# Patient Record
Sex: MALE | Race: WHITE | NOT HISPANIC OR LATINO | URBAN - METROPOLITAN AREA
[De-identification: names, ages, dates, MRNs, and addresses within clinical notes are randomized per-mention and may not be internally consistent; named-entity substitution may affect disease eponyms.]

---

## 2018-09-11 ENCOUNTER — OFFICE VISIT (OUTPATIENT)
Dept: FAMILY MEDICINE | Facility: CLINIC | Age: 43
End: 2018-09-11
Payer: COMMERCIAL

## 2018-09-11 VITALS
HEART RATE: 72 BPM | DIASTOLIC BLOOD PRESSURE: 84 MMHG | BODY MASS INDEX: 27.22 KG/M2 | WEIGHT: 201 LBS | SYSTOLIC BLOOD PRESSURE: 134 MMHG | HEIGHT: 72 IN | TEMPERATURE: 97.8 F

## 2018-09-11 DIAGNOSIS — Z12.5 SCREENING FOR PROSTATE CANCER: ICD-10-CM

## 2018-09-11 DIAGNOSIS — Z00.00 ROUTINE GENERAL MEDICAL EXAMINATION AT A HEALTH CARE FACILITY: ICD-10-CM

## 2018-09-11 DIAGNOSIS — R53.83 FATIGUE, UNSPECIFIED TYPE: ICD-10-CM

## 2018-09-11 DIAGNOSIS — Z13.6 CARDIOVASCULAR SCREENING; LDL GOAL LESS THAN 100: ICD-10-CM

## 2018-09-11 DIAGNOSIS — Z83.719 FAMILY HISTORY OF POLYPS IN THE COLON: Primary | ICD-10-CM

## 2018-09-11 LAB
ALBUMIN SERPL-MCNC: 3.6 G/DL (ref 3.4–5)
ALP SERPL-CCNC: 99 U/L (ref 40–150)
ALT SERPL W P-5'-P-CCNC: 38 U/L (ref 0–70)
ANION GAP SERPL CALCULATED.3IONS-SCNC: 6 MMOL/L (ref 3–14)
AST SERPL W P-5'-P-CCNC: 22 U/L (ref 0–45)
BASOPHILS # BLD AUTO: 0.1 10E9/L (ref 0–0.2)
BASOPHILS NFR BLD AUTO: 1.2 %
BILIRUB SERPL-MCNC: 0.5 MG/DL (ref 0.2–1.3)
BUN SERPL-MCNC: 22 MG/DL (ref 7–30)
CALCIUM SERPL-MCNC: 8.8 MG/DL (ref 8.5–10.1)
CHLORIDE SERPL-SCNC: 107 MMOL/L (ref 94–109)
CHOLEST SERPL-MCNC: 160 MG/DL
CO2 SERPL-SCNC: 27 MMOL/L (ref 20–32)
CREAT SERPL-MCNC: 0.97 MG/DL (ref 0.66–1.25)
DEPRECATED CALCIDIOL+CALCIFEROL SERPL-MC: 37 UG/L (ref 20–75)
DIFFERENTIAL METHOD BLD: NORMAL
EOSINOPHIL # BLD AUTO: 0.2 10E9/L (ref 0–0.7)
EOSINOPHIL NFR BLD AUTO: 4.7 %
ERYTHROCYTE [DISTWIDTH] IN BLOOD BY AUTOMATED COUNT: 13.2 % (ref 10–15)
GFR SERPL CREATININE-BSD FRML MDRD: 85 ML/MIN/1.7M2
GLUCOSE SERPL-MCNC: 89 MG/DL (ref 70–99)
HCT VFR BLD AUTO: 46.9 % (ref 40–53)
HDLC SERPL-MCNC: 34 MG/DL
HGB BLD-MCNC: 16 G/DL (ref 13.3–17.7)
LDLC SERPL CALC-MCNC: 81 MG/DL
LYMPHOCYTES # BLD AUTO: 1.9 10E9/L (ref 0.8–5.3)
LYMPHOCYTES NFR BLD AUTO: 38.2 %
MCH RBC QN AUTO: 30.1 PG (ref 26.5–33)
MCHC RBC AUTO-ENTMCNC: 34.1 G/DL (ref 31.5–36.5)
MCV RBC AUTO: 88 FL (ref 78–100)
MONOCYTES # BLD AUTO: 0.5 10E9/L (ref 0–1.3)
MONOCYTES NFR BLD AUTO: 9.2 %
NEUTROPHILS # BLD AUTO: 2.3 10E9/L (ref 1.6–8.3)
NEUTROPHILS NFR BLD AUTO: 46.7 %
NONHDLC SERPL-MCNC: 126 MG/DL
PLATELET # BLD AUTO: 227 10E9/L (ref 150–450)
POTASSIUM SERPL-SCNC: 4.1 MMOL/L (ref 3.4–5.3)
PROT SERPL-MCNC: 7.3 G/DL (ref 6.8–8.8)
PSA SERPL-ACNC: 2.35 UG/L (ref 0–4)
RBC # BLD AUTO: 5.31 10E12/L (ref 4.4–5.9)
SODIUM SERPL-SCNC: 140 MMOL/L (ref 133–144)
TRIGL SERPL-MCNC: 226 MG/DL
TSH SERPL DL<=0.005 MIU/L-ACNC: 2.42 MU/L (ref 0.4–4)
WBC # BLD AUTO: 4.9 10E9/L (ref 4–11)

## 2018-09-11 PROCEDURE — G0103 PSA SCREENING: HCPCS | Performed by: FAMILY MEDICINE

## 2018-09-11 PROCEDURE — 80061 LIPID PANEL: CPT | Performed by: FAMILY MEDICINE

## 2018-09-11 PROCEDURE — 36415 COLL VENOUS BLD VENIPUNCTURE: CPT | Performed by: FAMILY MEDICINE

## 2018-09-11 PROCEDURE — 80053 COMPREHEN METABOLIC PANEL: CPT | Performed by: FAMILY MEDICINE

## 2018-09-11 PROCEDURE — 84443 ASSAY THYROID STIM HORMONE: CPT | Performed by: FAMILY MEDICINE

## 2018-09-11 PROCEDURE — 99396 PREV VISIT EST AGE 40-64: CPT | Performed by: FAMILY MEDICINE

## 2018-09-11 PROCEDURE — 85025 COMPLETE CBC W/AUTO DIFF WBC: CPT | Performed by: FAMILY MEDICINE

## 2018-09-11 PROCEDURE — 82306 VITAMIN D 25 HYDROXY: CPT | Performed by: FAMILY MEDICINE

## 2018-09-11 ASSESSMENT — ENCOUNTER SYMPTOMS
ARTHRALGIAS: 0
JOINT SWELLING: 0
PARESTHESIAS: 0
HEADACHES: 0
NERVOUS/ANXIOUS: 0
HEMATURIA: 0
FEVER: 0
SHORTNESS OF BREATH: 0
EYE PAIN: 0
PALPITATIONS: 0
DIARRHEA: 0
SORE THROAT: 0
HEARTBURN: 0
WEAKNESS: 0
COUGH: 0
DIZZINESS: 0
DYSURIA: 0
FREQUENCY: 0
MYALGIAS: 0
NAUSEA: 0
ABDOMINAL PAIN: 0
HEMATOCHEZIA: 0
CONSTIPATION: 0
CHILLS: 0

## 2018-09-11 ASSESSMENT — PAIN SCALES - GENERAL: PAINLEVEL: NO PAIN (0)

## 2018-09-11 NOTE — LETTER
[facility]   [address] [place]  [address]  [phone]                                   September 13, 2018    Dannie Hart  [address] [place] [facility] [address]        Dear Dannie,    The triglycerides are elevated.  No medication needed for this.      Advise low sugar diet.  Of note, some of the triglycerides elevation may be genetic.    Other labs are fine.    Results for orders placed or performed in visit on 09/11/18   Lipid panel reflex to direct LDL Fasting   Result Value Ref Range    Cholesterol 160 <200 mg/dL    Triglycerides 226 (H) <150 mg/dL    HDL Cholesterol 34 (L) >39 mg/dL    LDL Cholesterol Calculated 81 <100 mg/dL    Non HDL Cholesterol 126 <130 mg/dL   TSH with free T4 reflex   Result Value Ref Range    TSH 2.42 0.40 - 4.00 mU/L   CBC with platelets differential   Result Value Ref Range    WBC 4.9 4.0 - 11.0 10e9/L    RBC Count 5.31 4.4 - 5.9 10e12/L    Hemoglobin 16.0 13.3 - 17.7 g/dL    Hematocrit 46.9 40.0 - 53.0 %    MCV 88 78 - 100 fl    MCH 30.1 26.5 - 33.0 pg    MCHC 34.1 31.5 - 36.5 g/dL    RDW 13.2 10.0 - 15.0 %    Platelet Count 227 150 - 450 10e9/L    Diff Method Automated Method     % Neutrophils 46.7 %    % Lymphocytes 38.2 %    % Monocytes 9.2 %    % Eosinophils 4.7 %    % Basophils 1.2 %    Absolute Neutrophil 2.3 1.6 - 8.3 10e9/L    Absolute Lymphocytes 1.9 0.8 - 5.3 10e9/L    Absolute Monocytes 0.5 0.0 - 1.3 10e9/L    Absolute Eosinophils 0.2 0.0 - 0.7 10e9/L    Absolute Basophils 0.1 0.0 - 0.2 10e9/L   Comprehensive metabolic panel   Result Value Ref Range    Sodium 140 133 - 144 mmol/L    Potassium 4.1 3.4 - 5.3 mmol/L    Chloride 107 94 - 109 mmol/L    Carbon Dioxide 27 20 - 32 mmol/L    Anion Gap 6 3 - 14 mmol/L    Glucose 89 70 - 99 mg/dL    Urea Nitrogen 22 7 - 30 mg/dL    Creatinine 0.97 0.66 - 1.25 mg/dL    GFR Estimate 85 >60 mL/min/1.7m2    GFR Estimate If Black >90 >60 mL/min/1.7m2    Calcium 8.8 8.5 - 10.1 mg/dL     Bilirubin Total 0.5 0.2 - 1.3 mg/dL    Albumin 3.6 3.4 - 5.0 g/dL    Protein Total 7.3 6.8 - 8.8 g/dL    Alkaline Phosphatase 99 40 - 150 U/L    ALT 38 0 - 70 U/L    AST 22 0 - 45 U/L   Vitamin D Deficiency   Result Value Ref Range    Vitamin D Deficiency screening 37 20 - 75 ug/L   Prostate spec antigen screen   Result Value Ref Range    PSA 2.35 0 - 4 ug/L       If you have any questions please call the clinic at [phone]    Sincerely,    Adeel Ramirez MD  [facility]

## 2018-09-11 NOTE — PATIENT INSTRUCTIONS
Call to schedule colonoscopy but also check with insurance to make sure it is covered    We will send you lab results    Keep working on healthy diet/exercise     Occasional melatonin as needed at night

## 2018-09-11 NOTE — PROGRESS NOTES
SUBJECTIVE:   CC: Dannie Hart is an 42 year old male who presents for preventative health visit.     Physical   Annual:     Getting at least 3 servings of Calcium per day:  Yes    Bi-annual eye exam:  Yes    Dental care twice a year:  Yes    Sleep apnea or symptoms of sleep apnea:  Daytime drowsiness    Diet:  Regular (no restrictions)    Frequency of exercise:  2-3 days/week    Duration of exercise:  45-60 minutes    Taking medications regularly:  Yes    Medication side effects:  Not applicable    Additional concerns today:  YES        Trouble sleeping; wakes up 4-6 times per night, hard to get back to sleep  Tired    5-6 hours of sleep total  Needs 8     In the last few months    Patient not sure what is going on    Always light sleeper    Room/ bed conducive to sleep  [place]    Some increased stress with son going back to school        Saw orthopedics for shoulder, elbow, carpal tunnel  All better   Had carpal tunnel repair        Today's PHQ-2 Score:   PHQ-2 ( 1999 [employer]) 9/11/2018   Q1: Little interest or pleasure in doing things 0   Q2: Feeling down, depressed or hopeless 0   PHQ-2 Score 0   Q1: Little interest or pleasure in doing things Not at all   Q2: Feeling down, depressed or hopeless Not at all   PHQ-2 Score 0       Abuse: Current or Past(Physical, Sexual or Emotional)- No  Do you feel safe in your environment - Yes    Social History   Substance Use Topics     Smoking status: Never Smoker     Smokeless tobacco: Never Used     Alcohol use Yes      Comment: socially     Alcohol Use 9/11/2018   If you drink alcohol do you typically have greater than 3 drinks per day OR greater than 7 drinks per week? No   No flowsheet data found.    Last PSA: No results found for: PSA    Reviewed orders with patient. Reviewed health maintenance and updated orders accordingly - Yes       Reviewed and updated as needed this visit by clinical staff  Tobacco  Allergies  Problems  Med Hx  Surg Hx  Fam  "Hx  Soc Hx        Reviewed and updated as needed this visit by Provider            Review of Systems   Constitutional: Negative for chills and fever.   HENT: Negative for congestion, ear pain, hearing loss and sore throat.    Eyes: Negative for pain and visual disturbance.   Respiratory: Negative for cough and shortness of breath.    Cardiovascular: Negative for chest pain, palpitations and peripheral edema.   Gastrointestinal: Negative for abdominal pain, constipation, diarrhea, heartburn, hematochezia and nausea.   Genitourinary: Negative for discharge, dysuria, frequency, genital sores, hematuria, impotence and urgency.   Musculoskeletal: Negative for arthralgias, joint swelling and myalgias.   Skin: Negative for rash.   Neurological: Negative for dizziness, weakness, headaches and paresthesias.   Psychiatric/Behavioral: Negative for mood changes. The patient is not nervous/anxious.         [email or web address], works for about 4 hours but then same problem arises    No breathing problems through nose etc    No snoring     No stopping breathing at night    Wt up a little    No napping during the day    Vision fine    OBJECTIVE:   /84 (BP Location: Right arm, Patient Position: Chair, Cuff Size: Adult Regular)  Pulse 72  Temp 97.8  F (36.6  C) (Oral)  Ht 5' 11.56\" (1.817 m)  Wt 201 lb (91.2 kg)  BMI 27.6 kg/m2    Physical Exam  GENERAL: healthy, alert and no distress  HENT: ear canals and TM's normal, nose and mouth without ulcers or lesions  NECK: no adenopathy, no asymmetry, masses, or scars and thyroid normal to palpation  RESP: lungs clear to auscultation - no rales, rhonchi or wheezes  CV: regular rate and rhythm, normal S1 S2, no S3 or S4, no murmur, click or rub, no peripheral edema and peripheral pulses strong  ABDOMEN: soft, nontender, no hepatosplenomegaly, no masses and bowel sounds normal  MS: no gross musculoskeletal defects noted, no edema  SKIN: no suspicious lesions or rashes  NEURO: Normal " "strength and tone, mentation intact and speech normal  PSYCH: mentation appears normal, affect normal/bright    Diagnostic Test Results:  none     ASSESSMENT/PLAN:   Dannie was seen today for physical and health maintenance.    Diagnoses and all orders for this visit:    Family history of polyps in the colon  -     GASTROENTEROLOGY ADULT REF PROCEDURE ONLY Other; [facility] [phone]    Routine general medical examination at a health care facility    Fatigue, unspecified type  -     TSH with free T4 reflex  -     CBC with platelets differential  -     Comprehensive metabolic panel  -     Vitamin D Deficiency    CARDIOVASCULAR SCREENING; LDL GOAL LESS THAN 100  -     Lipid panel reflex to direct LDL Fasting    Screening for prostate cancer  -     Prostate spec antigen screen    Other orders  -     Cancel: HIV Antigen Antibody Combo  -     Cancel: TDAP, IM (10 - 64 YRS) - Adacel  -     Cancel: VACCINE ADMINISTRATION, INITIAL    Discussed multiple issues with patient  No std concern  Check labs  Can use occasional melatonin but advised against prescription sleep meds  With family history, ordered colonoscopy but patient will make sure this is covered by insurance      COUNSELING:   Reviewed preventive health counseling, as reflected in patient instructions       Regular exercise       Healthy diet/nutrition       Vision screening       Family planning       Safe sex practices/STD prevention       Prostate cancer screening    BP Readings from Last 1 Encounters:   09/11/18 134/84     Estimated body mass index is 27.6 kg/(m^2) as calculated from the following:    Height as of this encounter: 5' 11.56\" (1.817 m).    Weight as of this encounter: 201 lb (91.2 kg).    BP Screening:   Last 3 BP Readings:    BP Readings from Last 3 Encounters:   09/11/18 134/84   11/18/16 114/77   02/28/14 113/64       The following was recommended to the patient:  Re-screen BP within a year and recommended lifestyle modifications  Weight " management plan: Discussed healthy diet and exercise guidelines and patient will follow up in 12 months in clinic to re-evaluate.     reports that he has never smoked. He has never used smokeless tobacco.      Counseling Resources:  ATP IV Guidelines  Pooled Cohorts Equation Calculator  FRAX Risk Assessment  ICSI Preventive Guidelines  Dietary Guidelines for Americans, 2010  USDA's MyPlate  ASA Prophylaxis  Lung CA Screening    Adeel Ramirez MD  [facility]  Answers for HPI/ROS submitted by the patient on 9/11/2018   PHQ-2 Score: 0

## 2018-09-11 NOTE — MR AVS SNAPSHOT
After Visit Summary   9/11/2018    Dannie Hart    MRN: [id number]           Patient Information     Date Of Birth          1975        Visit Information        Provider Department      9/11/2018 7:40 AM Adeel Ramirez MD [facility]        Today's Diagnoses     Family history of polyps in the colon    -  1    Fatigue, unspecified type        CARDIOVASCULAR SCREENING; LDL GOAL LESS THAN 100        Screening for prostate cancer          Care Instructions    Call to schedule colonoscopy but also check with insurance to make sure it is covered    We will send you lab results    Keep working on healthy diet/exercise     Occasional melatonin as needed at night           Follow-ups after your visit        Additional Services     GASTROENTEROLOGY ADULT REF PROCEDURE ONLY Other; [facility] [phone]       Last Lab Result: Creatinine (mg/dL)       Date                     Value                 11/18/2016               0.99             ----------  Body mass index is 27.6 kg/(m^2).     Needed:  No  Language:  English    Patient will be contacted to schedule procedure.     Please be aware that coverage of these services is subject to the terms and limitations of your health insurance plan.  Call member services at your health plan with any benefit or coverage questions.  Any procedures must be performed at a [facility] facility OR coordinated by your clinic's referral office.    Please bring the following with you to your appointment:    (1) Any X-Rays, CTs or MRIs which have been performed.  Contact the facility where they were done to arrange for  prior to your scheduled appointment.    (2) List of current medications   (3) This referral request   (4) Any documents/labs given to you for this referral                  Who to contact     If you have questions or need follow up information about today's clinic visit or your schedule please contact [facility]  "[facility] directly at [phone].  Normal or non-critical lab and imaging results will be communicated to you by MyChart, letter or phone within 4 business days after the clinic has received the results. If you do not hear from us within 7 days, please contact the clinic through [employer] or phone. If you have a critical or abnormal lab result, we will notify you by phone as soon as possible.  Submit refill requests through [employer] or call your pharmacy and they will forward the refill request to us. Please allow 3 business days for your refill to be completed.          Additional Information About Your Visit        MoodMe Information     MoodMe lets you send messages to your doctor, view your test results, renew your prescriptions, schedule appointments and more. To sign up, go to [email or web address] . Click on \"Log in\" on the left side of the screen, which will take you to the Welcome page. Then click on \"Sign up Now\" on the right side of the page.     You will be asked to enter the access code listed below, as well as some personal information. Please follow the directions to create your username and password.     Your access code is: [email or web address]  Expires: 12/10/2018  8:37 AM     Your access code will  in 90 days. If you need help or a new code, please call your [facility] clinic or [phone].        Care EveryWhere ID     This is your Care EveryWhere ID. This could be used by other organizations to access your [facility] medical records  [id number]        Your Vitals Were     Pulse Temperature Height BMI (Body Mass Index)          72 97.8  F (36.6  C) (Oral) 5' 11.56\" (1.817 m) 27.6 kg/m2         Blood Pressure from Last 3 Encounters:   18 134/84   16 114/77   14 113/64    Weight from Last 3 Encounters:   18 201 lb (91.2 kg)   16 195 lb (88.5 kg)   14 205 lb (93 kg)              We Performed the Following     CBC with platelets differential     " Comprehensive metabolic panel     GASTROENTEROLOGY ADULT REF PROCEDURE ONLY Other; [facility] [phone]     Lipid panel reflex to direct LDL Fasting     Prostate spec antigen screen     TSH with free T4 reflex     Vitamin D Deficiency        Primary Care Provider Office Phone # Fax #    Adeel Ramirez [phone]       [address] [facility] [address]        Equal Access to Services     [facility] : Hadii aad ku hadasho Soomaali, waaxda luqadaha, qaybta kaalmada adeegyada, waxay idiin hayaan adeeg kharash la'aan . So [facility] [phone].    ATENCIÓN: Si habla español, tiene a lindsey disposición servicios gratuitos de asistencia lingüística. Llame al [phone].    We comply with applicable federal civil rights laws and [place] laws. We do not discriminate on the basis of race, color, national origin, age, disability, sex, sexual orientation, or gender identity.            Thank you!     Thank you for choosing [facility]  for your care. Our goal is always to provide you with excellent care. Hearing back from our patients is one way we can continue to improve our services. Please take a few minutes to complete the written survey that you may receive in the mail after your visit with us. Thank you!             Your Updated Medication List - Protect others around you: Learn how to safely use, store and throw away your medicines at www.disposemymeds.org.          This list is accurate as of 9/11/18  8:37 AM.  Always use your most recent med list.                   Brand Name Dispense Instructions for use Diagnosis    ibuprofen 800 MG tablet    ADVIL/MOTRIN     Take 800 mg by mouth every 8 hours as needed. As needed

## 2018-09-13 NOTE — PROGRESS NOTES
The triglycerides are elevated.  No medication needed for this.      Advise low sugar diet.  Of note, some of the triglycerides elevation may be genetic.    Other labs are fine.    Adeel Ramirez MD

## 2018-09-24 ENCOUNTER — TELEPHONE (OUTPATIENT)
Dept: FAMILY MEDICINE | Facility: CLINIC | Age: 43
End: 2018-09-24

## 2018-10-02 ENCOUNTER — TRANSFERRED RECORDS (OUTPATIENT)
Dept: HEALTH INFORMATION MANAGEMENT | Facility: CLINIC | Age: 43
End: 2018-10-02

## 2018-10-15 ENCOUNTER — OFFICE VISIT (OUTPATIENT)
Dept: FAMILY MEDICINE | Facility: CLINIC | Age: 43
End: 2018-10-15
Payer: COMMERCIAL

## 2018-10-15 VITALS
BODY MASS INDEX: 27.46 KG/M2 | OXYGEN SATURATION: 97 % | HEART RATE: 72 BPM | WEIGHT: 200 LBS | TEMPERATURE: 97.2 F | SYSTOLIC BLOOD PRESSURE: 115 MMHG | DIASTOLIC BLOOD PRESSURE: 70 MMHG

## 2018-10-15 DIAGNOSIS — F41.9 ANXIETY: ICD-10-CM

## 2018-10-15 DIAGNOSIS — F32.1 MODERATE MAJOR DEPRESSION (H): ICD-10-CM

## 2018-10-15 PROCEDURE — 99213 OFFICE O/P EST LOW 20 MIN: CPT | Performed by: FAMILY MEDICINE

## 2018-10-15 ASSESSMENT — PATIENT HEALTH QUESTIONNAIRE - PHQ9: 5. POOR APPETITE OR OVEREATING: NEARLY EVERY DAY

## 2018-10-15 ASSESSMENT — ANXIETY QUESTIONNAIRES
3. WORRYING TOO MUCH ABOUT DIFFERENT THINGS: NEARLY EVERY DAY
IF YOU CHECKED OFF ANY PROBLEMS ON THIS QUESTIONNAIRE, HOW DIFFICULT HAVE THESE PROBLEMS MADE IT FOR YOU TO DO YOUR WORK, TAKE CARE OF THINGS AT HOME, OR GET ALONG WITH OTHER PEOPLE: EXTREMELY DIFFICULT
1. FEELING NERVOUS, ANXIOUS, OR ON EDGE: NEARLY EVERY DAY
5. BEING SO RESTLESS THAT IT IS HARD TO SIT STILL: NEARLY EVERY DAY
GAD7 TOTAL SCORE: 21
7. FEELING AFRAID AS IF SOMETHING AWFUL MIGHT HAPPEN: NEARLY EVERY DAY
6. BECOMING EASILY ANNOYED OR IRRITABLE: NEARLY EVERY DAY
2. NOT BEING ABLE TO STOP OR CONTROL WORRYING: NEARLY EVERY DAY

## 2018-10-15 NOTE — PATIENT INSTRUCTIONS
Start 1/2 pill daily sertaline for 1-2 weeks, then can increase to one pill daily    See us in 1-2 months, sooner if needed     Could also call to schedule counseling/ therapy if desired    Call with problems/  Questions    Exercise, etc

## 2018-10-15 NOTE — PROGRESS NOTES
SUBJECTIVE:   Dannie Hart is a 42 year old male who presents to clinic today for the following health issues:        Abnormal Mood Symptoms  Onset: 4-5 weeks     Description:   Depression: YES  Anxiety: YES    Accompanying Signs & Symptoms:  Still participating in activities that you used to enjoy: no  Fatigue: YES  Irritability: YES  Difficulty concentrating: YES  Changes in appetite: YES- a little bit   Problems with sleep: YES  Heart racing/beating fast : no   Thoughts of hurting yourself or others: none    History:   Recent stress: YES  Prior depression hospitalization: None  Family history of depression: no   Family history of anxiety: no     Precipitating factors:   Alcohol/drug use: no     Alleviating factors:  None     Therapies Tried and outcome: None         Problem list and histories reviewed & adjusted, as indicated.  Additional history: as documented         Reviewed and updated as needed this visit by clinical staff  Tobacco  Allergies  Meds  Med Hx  Surg Hx  Fam Hx  Soc Hx      Reviewed and updated as needed this visit by Provider          was a little stressed when seen for physical    But last few weeks real bad    Some days particularly bad    10 year ago divorce, was on meds for 3 months then but was fine afterward    Hard to focus on grad classes    Sleeping not good     Was having some sleep issues before but worse now    Sometimes sleep well but sometimes not    Short temper     Irritable    Almost a tingling / uncomfortable feeling, mainly in hands    Phy     Taking some grad classes for education    Hungry recently    Up about 5 lb    No particular event set this off        Physical Exam   Constitutional: He is oriented to person, place, and time and well-developed, well-nourished, and in no distress. No distress.   HENT:   Head: Normocephalic and atraumatic.   Eyes: Conjunctivae are normal.   Neck: Carotid bruit is not present.   Cardiovascular: Normal rate, regular  rhythm, normal heart sounds and intact distal pulses.  Exam reveals no gallop and no friction rub.    No murmur heard.  Pulmonary/Chest: Effort normal and breath sounds normal. No respiratory distress. He has no wheezes. He has no rales.   Musculoskeletal: He exhibits no edema.   Neurological: He is alert and oriented to person, place, and time.   Skin: He is not diaphoretic.   Psychiatric: Mood and affect normal.   no tremor of hands        See phq9 and gad7    ASSESSMENT / PLAN:  (F41.9) Anxiety  (primary encounter diagnosis)  Comment: significant scores on both gad7 and phq9.  Discussed in detail.  Prudent to start med.  If patient also wants to do counseling, call for appointment.  I did referral   Plan: MENTAL HEALTH REFERRAL  - Adult; Outpatient         Treatment; Individual/Couples/Family/Group         Therapy/Health Psychology; [facility]: [facility] [phone]; We will         contact you to schedule the appointment or         please call with any questions, sertraline         (ZOLOFT) 50 MG tablet        See us in 1-2 months, sooner if needed     (F32.1) Moderate major depression (H)  Comment: as above  Plan: sertraline (ZOLOFT) 50 MG tablet               I reviewed the patient's medications, allergies, medical history, family history, and social history.    Adeel Ramirez MD

## 2018-10-15 NOTE — MR AVS SNAPSHOT
After Visit Summary   10/15/2018    Dannie Hart    MRN: [id number]           Patient Information     Date Of Birth          1975        Visit Information        Provider Department      10/15/2018 8:20 AM Adeel Ramirez MD [facility]        Today's Diagnoses     Anxiety    -  1    Moderate major depression (H)          Care Instructions    Start 1/2 pill daily sertaline for 1-2 weeks, then can increase to one pill daily    See us in 1-2 months, sooner if needed     Could also call to schedule counseling/ therapy if desired    Call with problems/  Questions    Exercise, etc           Follow-ups after your visit        Additional Services     MENTAL HEALTH REFERRAL  - Adult; Outpatient Treatment; Individual/Couples/Family/Group Therapy/Health Psychology; [facility]: [facility] [phone]; We will contact you to schedule the appointment or please call with any questions       All scheduling is subject to the client's specific insurance plan & benefits, provider/location availability, and provider clinical specialities.  Please arrive 15 minutes early for your first appointment and bring your completed paperwork.    Please be aware that coverage of these services is subject to the terms and limitations of your health insurance plan.  Call member services at your health plan with any benefit or coverage questions.                            Who to contact     If you have questions or need follow up information about today's clinic visit or your schedule please contact [facility] directly at [phone].  Normal or non-critical lab and imaging results will be communicated to you by MyChart, letter or phone within 4 business days after the clinic has received the results. If you do not hear from us within 7 days, please contact the clinic through MyChart or phone. If you have a critical or abnormal lab result, we will notify you by phone  "as soon as possible.  Submit refill requests through [employer] or call your pharmacy and they will forward the refill request to us. Please allow 3 business days for your refill to be completed.          Additional Information About Your Visit        [employer] Information     [employer] lets you send messages to your doctor, view your test results, renew your prescriptions, schedule appointments and more. To sign up, go to [email or web address] . Click on \"Log in\" on the left side of the screen, which will take you to the Welcome page. Then click on \"Sign up Now\" on the right side of the page.     You will be asked to enter the access code listed below, as well as some personal information. Please follow the directions to create your username and password.     Your access code is: [email or web address]  Expires: 12/10/2018  8:37 AM     Your access code will  in 90 days. If you need help or a new code, please call your [place] clinic or [phone].        Care EveryWhere ID     This is your Care EveryWhere ID. This could be used by other organizations to access your [facility] medical records  [id number]        Your Vitals Were     Pulse Temperature Pulse Oximetry BMI (Body Mass Index)          72 97.2  F (36.2  C) (Oral) 97% 27.46 kg/m2         Blood Pressure from Last 3 Encounters:   10/15/18 115/70   18 134/84   16 114/77    Weight from Last 3 Encounters:   10/15/18 200 lb (90.7 kg)   18 201 lb (91.2 kg)   16 195 lb (88.5 kg)              We Performed the Following     MENTAL HEALTH REFERRAL  - Adult; Outpatient Treatment; Individual/Couples/Family/Group Therapy/Health Psychology; G: [facility] [phone]; We will contact you to schedule the appointment or please call with any questions          Today's Medication Changes          These changes are accurate as of 10/15/18  8:53 AM.  If you have any questions, ask your nurse or doctor.               Start taking these " medicines.        Dose/Directions    sertraline 50 MG tablet   Commonly known as:  ZOLOFT   Used for:  Anxiety, Moderate major depression (H)   Started by:  Adeel Ramirez MD        Dose:  50 mg   Take 1 tablet (50 mg) by mouth daily   Quantity:  30 tablet   Refills:  1            Where to get your medicines      These medications were sent to [id number] - [place], [place] - [address] AT [facility] [address]  [address], [place] [address]     Phone:  [phone]     sertraline 50 MG tablet                Primary Care Provider Office Phone # Fax #    Adeel Ramirez [phone]       [address] [facility] [address]        Equal Access to Services     [facility] : Hadii dg obando hadasho Soomaali, waaxda luqadaha, qaybta kaalmada adeegyada, cathi pinedain dora kim . So [facility] [phone].    ATENCIÓN: Si habla español, tiene a lindsey disposición servicios gratuitos de asistencia lingüística. Llame al [phone].    We comply with applicable federal civil rights laws and [place] laws. We do not discriminate on the basis of race, color, national origin, age, disability, sex, sexual orientation, or gender identity.            Thank you!     Thank you for choosing [facility]  for your care. Our goal is always to provide you with excellent care. Hearing back from our patients is one way we can continue to improve our services. Please take a few minutes to complete the written survey that you may receive in the mail after your visit with us. Thank you!             Your Updated Medication List - Protect others around you: Learn how to safely use, store and throw away your medicines at www.disposemymeds.org.          This list is accurate as of 10/15/18  8:53 AM.  Always use your most recent med list.                   Brand Name Dispense Instructions for use Diagnosis    ibuprofen 800 MG tablet    ADVIL/MOTRIN      Take 800 mg by mouth every 8 hours as needed. As needed        sertraline 50 MG tablet    ZOLOFT    30 tablet    Take 1 tablet (50 mg) by mouth daily    Anxiety, Moderate major depression (H)

## 2018-10-16 ASSESSMENT — ANXIETY QUESTIONNAIRES: GAD7 TOTAL SCORE: 21

## 2018-10-16 ASSESSMENT — PATIENT HEALTH QUESTIONNAIRE - PHQ9: SUM OF ALL RESPONSES TO PHQ QUESTIONS 1-9: 20

## 2018-10-23 PROBLEM — F41.9 ANXIETY: Status: ACTIVE | Noted: 2018-10-23

## 2018-10-23 PROBLEM — F32.1 MODERATE MAJOR DEPRESSION (H): Status: ACTIVE | Noted: 2018-10-23

## 2018-12-02 ENCOUNTER — TELEPHONE (OUTPATIENT)
Dept: FAMILY MEDICINE | Facility: CLINIC | Age: 43
End: 2018-12-02

## 2018-12-02 DIAGNOSIS — F41.9 ANXIETY: ICD-10-CM

## 2018-12-02 DIAGNOSIS — F32.1 MODERATE MAJOR DEPRESSION (H): ICD-10-CM

## 2018-12-03 NOTE — TELEPHONE ENCOUNTER
"Requested Prescriptions   Pending Prescriptions Disp Refills     sertraline (ZOLOFT) 50 MG tablet [Pharmacy Med Name: SERTRALINE 50MG TABLETS] 30 tablet 0    Last Written Prescription Date:  10/15/18  Last Fill Quantity: 30,  # refills: 1   Last office visit: 10/15/2018 with prescribing provider:     Future Office Visit:     Sig: TAKE 1 TABLET(50 MG) BY MOUTH DAILY    SSRIs Protocol Failed    12/2/2018  7:12 PM       Failed - PHQ-9 score less than 5 in past 6 months    Please review last PHQ-9 score.          Passed - Patient is age 18 or older       Passed - Recent (6 mo) or future (30 days) visit within the authorizing provider's specialty    Patient had office visit in the last 6 months or has a visit in the next 30 days with authorizing provider or within the authorizing provider's specialty.  See \"Patient Info\" tab in inbasket, or \"Choose Columns\" in Meds & Orders section of the refill encounter.              "

## 2018-12-04 NOTE — TELEPHONE ENCOUNTER
See plan from last visit 10/15/18:    Patient Instructions    Start 1/2 pill daily sertaline for 1-2 weeks, then can increase to one pill daily     See us in 1-2 months, sooner if needed        Should not be totally out of med yet.    Attempted to call patient at home/mobile number, left message on voicemail; patient was instructed to return call to [facility] RN directly on the RN call back line at [phone], needs to schedule follow up, will he need short refill before seen?    Nemo Zarco, RN  [facility]

## 2018-12-04 NOTE — TELEPHONE ENCOUNTER
"Patient called back which I picked up, he has 2 weeks of zoloft left, says it is helping a lot.    Scheduled office visit this Friday, no refill needed before then.    \"Refusal\" routed back to pharmacy with note.    Nemo Zarco RN  [facility]      "

## 2018-12-04 NOTE — TELEPHONE ENCOUNTER
Patient called triage line back.  Left a message that he will be available after 2:00.  Belem Santos RN [facility] Triage.

## 2018-12-07 ENCOUNTER — OFFICE VISIT (OUTPATIENT)
Dept: FAMILY MEDICINE | Facility: CLINIC | Age: 43
End: 2018-12-07
Payer: COMMERCIAL

## 2018-12-07 VITALS
DIASTOLIC BLOOD PRESSURE: 72 MMHG | WEIGHT: 199 LBS | BODY MASS INDEX: 27.33 KG/M2 | OXYGEN SATURATION: 97 % | TEMPERATURE: 97.6 F | SYSTOLIC BLOOD PRESSURE: 110 MMHG | HEART RATE: 63 BPM

## 2018-12-07 DIAGNOSIS — F32.1 MODERATE MAJOR DEPRESSION (H): ICD-10-CM

## 2018-12-07 DIAGNOSIS — F41.9 ANXIETY: ICD-10-CM

## 2018-12-07 PROCEDURE — 99213 OFFICE O/P EST LOW 20 MIN: CPT | Performed by: FAMILY MEDICINE

## 2018-12-07 ASSESSMENT — PATIENT HEALTH QUESTIONNAIRE - PHQ9
SUM OF ALL RESPONSES TO PHQ QUESTIONS 1-9: 6
5. POOR APPETITE OR OVEREATING: SEVERAL DAYS

## 2018-12-07 ASSESSMENT — ANXIETY QUESTIONNAIRES
2. NOT BEING ABLE TO STOP OR CONTROL WORRYING: NOT AT ALL
6. BECOMING EASILY ANNOYED OR IRRITABLE: SEVERAL DAYS
IF YOU CHECKED OFF ANY PROBLEMS ON THIS QUESTIONNAIRE, HOW DIFFICULT HAVE THESE PROBLEMS MADE IT FOR YOU TO DO YOUR WORK, TAKE CARE OF THINGS AT HOME, OR GET ALONG WITH OTHER PEOPLE: SOMEWHAT DIFFICULT
3. WORRYING TOO MUCH ABOUT DIFFERENT THINGS: SEVERAL DAYS
GAD7 TOTAL SCORE: 5
5. BEING SO RESTLESS THAT IT IS HARD TO SIT STILL: SEVERAL DAYS
1. FEELING NERVOUS, ANXIOUS, OR ON EDGE: SEVERAL DAYS
7. FEELING AFRAID AS IF SOMETHING AWFUL MIGHT HAPPEN: NOT AT ALL

## 2018-12-07 NOTE — PATIENT INSTRUCTIONS
Stay on same medication    If still on this in 5-6 months, return to clinic     If you want to try to taper off before then, go 1/2 pill daily for a week, then 1/2 pill every other day for a week, then off    Call with problems/ questions

## 2018-12-07 NOTE — MR AVS SNAPSHOT
After Visit Summary   12/7/2018    Dannie Hart    MRN: [id number]           Patient Information     Date Of Birth          1975        Visit Information        Provider Department      12/7/2018 3:40 PM Adeel Ramirez MD [facility]        Today's Diagnoses     Anxiety        Moderate major depression (H)          Care Instructions    Stay on same medication    If still on this in 5-6 months, return to clinic     If you want to try to taper off before then, go 1/2 pill daily for a week, then 1/2 pill every other day for a week, then off    Call with problems/ questions            Follow-ups after your visit        Who to contact     If you have questions or need follow up information about today's clinic visit or your schedule please contact [facility] directly at [phone].  Normal or non-critical lab and imaging results will be communicated to you by MyChart, letter or phone within 4 business days after the clinic has received the results. If you do not hear from us within 7 days, please contact the clinic through MyChart or phone. If you have a critical or abnormal lab result, we will notify you by phone as soon as possible.  Submit refill requests through [employer] or call your pharmacy and they will forward the refill request to us. Please allow 3 business days for your refill to be completed.          Additional Information About Your Visit        Care EveryWhere ID     This is your Care EveryWhere ID. This could be used by other organizations to access your [place] [facility] medical records  [id number]        Your Vitals Were     Pulse Temperature Pulse Oximetry BMI (Body Mass Index)          63 97.6  F (36.4  C) (Oral) 97% 27.33 kg/m2         Blood Pressure from Last 3 Encounters:   12/07/18 110/72   10/15/18 115/70   09/11/18 134/84    Weight from Last 3 Encounters:   12/07/18 199 lb (90.3 kg)   10/15/18 200 lb (90.7 kg)   09/11/18 201 lb  (91.2 kg)              We Performed the Following     DEPRESSION ACTION PLAN (DAP)          Where to get your medicines      These medications were sent to [facility] - [place], [place] - [address] AT [facility] [address]  [address], [place] [address]     Phone:  [phone]     sertraline 50 MG tablet          Primary Care Provider Office Phone # Fax #    Adeel Ramirez [phone]       [address] [facility] [address]        Equal Access to Services     [facility]: Hadii aad ku hadasho Soomaali, waaxda luqadaha, qaybta kaalmada adeegyada, cathi kim . So [facility] [phone].    ATENCIÓN: Si habla español, tiene a lindsey disposición servicios gratuitos de asistencia lingüística. [facility] [phone].    We comply with applicable federal civil rights laws and [place] laws. We do not discriminate on the basis of race, color, national origin, age, disability, sex, sexual orientation, or gender identity.            Thank you!     Thank you for choosing [facility]  for your care. Our goal is always to provide you with excellent care. Hearing back from our patients is one way we can continue to improve our services. Please take a few minutes to complete the written survey that you may receive in the mail after your visit with us. Thank you!             Your Updated Medication List - Protect others around you: Learn how to safely use, store and throw away your medicines at www.disposemymeds.org.          This list is accurate as of 12/7/18  4:03 PM.  Always use your most recent med list.                   Brand Name Dispense Instructions for use Diagnosis    ibuprofen 800 MG tablet    ADVIL/MOTRIN     Take 800 mg by mouth every 8 hours as needed. As needed        sertraline 50 MG tablet    ZOLOFT    90 tablet    Take 1 tablet (50 mg) by mouth daily    Anxiety, Moderate major depression (H)

## 2018-12-07 NOTE — PROGRESS NOTES
SUBJECTIVE:   Dannie Hart is a 43 year old male who presents to clinic today for the following health issues:      Depression and Anxiety Follow-Up    Status since last visit: Improved     Other associated symptoms:None    Complicating factors:     Significant life event: No     Current substance abuse: None    PHQ 10/15/2018 12/7/2018   PHQ-9 Total Score 20 6   Q9: Suicide Ideation Not at all Not at all     EVELYN-7 SCORE 10/15/2018   Total Score 21        PHQ-9  English  PHQ-9   Any Language  EVELYN-7  Suicide Assessment Five-step Evaluation and Treatment (SAFE-T)    Amount of exercise or physical activity: daily     Problems taking medications regularly: No    Medication side effects: none    Diet: regular (no restrictions)             Problem list and histories reviewed & adjusted, as indicated.  Additional history: as documented         Reviewed and updated as needed this visit by clinical staff  Tobacco  Allergies  Meds  Med Hx  Surg Hx  Fam Hx  Soc Hx      Reviewed and updated as needed this visit by Provider          sleeping better    Still waking up but not as much    Doing well on med     No side effects    Patient has not done the counselor/ therapist thing yet    Physical Exam   Constitutional: He is oriented to person, place, and time and well-developed, well-nourished, and in no distress. No distress.   HENT:   Head: Normocephalic and atraumatic.   Eyes: Conjunctivae are normal.   Neck: Carotid bruit is not present.   Cardiovascular: Normal rate, regular rhythm, normal heart sounds and intact distal pulses.  Exam reveals no gallop and no friction rub.    No murmur heard.  Pulmonary/Chest: Effort normal and breath sounds normal. No respiratory distress. He has no wheezes. He has no rales.   Neurological: He is alert and oriented to person, place, and time.   Skin: He is not diaphoretic.   Psychiatric: Mood and affect normal.   no tremor of extremity    ASSESSMENT / PLAN:  (F41.9) Anxiety  Comment:  patient stable on med.  Refills given.  See AVS  Plan: sertraline (ZOLOFT) 50 MG tablet             (F32.1) Moderate major depression (H)  Comment: as above  Plan: sertraline (ZOLOFT) 50 MG tablet        Patient still has the referral for counselor/therapist if needed       I reviewed the patient's medications, allergies, medical history, family history, and social history.    Adeel Ramirez MD

## 2018-12-08 ASSESSMENT — ANXIETY QUESTIONNAIRES: GAD7 TOTAL SCORE: 5

## 2019-05-22 ENCOUNTER — TELEPHONE (OUTPATIENT)
Dept: FAMILY MEDICINE | Facility: CLINIC | Age: 44
End: 2019-05-22

## 2019-05-22 NOTE — TELEPHONE ENCOUNTER
Panel Management Review      Patient has the following on his problem list:     Depression / Dysthymia review    Measure:  Needs PHQ-9 score of 4 or less during index window.  Administer PHQ-9 and if score is 5 or more, send encounter to provider for next steps.    5 - 7 month window range: 02/14/2019-06/14/2019    PHQ-9 SCORE 10/15/2018 12/7/2018   PHQ-9 Total Score 20 6       If PHQ-9 recheck is 5 or more, route to provider for next steps.    Patient is due for:  PHQ9      Composite cancer screening  Chart review shows that this patient is due/due soon for the following None  Summary:    Patient is due/failing the following:   PHQ9    Action needed:   Patient needs to do PHQ9.    Type of outreach:    Phone, left message for patient to call back.  05/22/2019    Questions for provider review:    None                                                                                                                                    Cynthia López [facility]       Chart routed to Care Team .

## 2019-05-30 ENCOUNTER — TELEPHONE (OUTPATIENT)
Dept: FAMILY MEDICINE | Facility: CLINIC | Age: 44
End: 2019-05-30

## 2019-05-30 DIAGNOSIS — Z80.9 FAMILY HISTORY OF CANCER: Primary | ICD-10-CM

## 2019-05-30 ASSESSMENT — PATIENT HEALTH QUESTIONNAIRE - PHQ9: SUM OF ALL RESPONSES TO PHQ QUESTIONS 1-9: 8

## 2019-05-30 NOTE — TELEPHONE ENCOUNTER
Reason for Call: Request for an order or referral:    Order or referral being requested: Patient is requesting that orders be placed for genetic testing. They would like patient to be tested for pancreatic, colon and prostrate.  His mother has PABLO   gene mutation. The name of his mom's mutation is  [id number] He states he needs the order faxed to Krystyna at [phone].     Date needed: as soon as possible    Has the patient been seen by the PCP for this problem? Not Applicable    Additional comments:     Phone number Patient can be reached at:  Home number on file [phone] (home)    Best Time:  any    Can we leave a detailed message on this number?  YES    Call taken on 5/30/2019 at 10:57 AM by Radha Alvarez

## 2019-05-30 NOTE — TELEPHONE ENCOUNTER
Routed to Dr. Ramirez to address request for genetic testing.    Of note, it appears patient is due to be seen in June.  Perhaps can discuss this in clinic unless provider aware of this issue.    Nemo Zarco RN  [facility]

## 2019-05-30 NOTE — TELEPHONE ENCOUNTER
Patient called back I did the phq-9 questions with him. He will make his follow up in June.   Cynthia López CMA

## 2019-05-31 ENCOUNTER — TELEPHONE (OUTPATIENT)
Dept: ONCOLOGY | Facility: CLINIC | Age: 44
End: 2019-05-31

## 2019-05-31 NOTE — TELEPHONE ENCOUNTER
Attempt # 1  Called patient at home [phone]  Was call answered?  No answer, left message to call nurse line at [phone]    Referral information:  We have a sent a notice to a staff member of the Cancer Risk Management Program to give you a call to assist with scheduling your appointment.  You may also call  [phone] ([phone]) to initiate scheduling.      Zoë Sampson RN  [facility]

## 2019-05-31 NOTE — TELEPHONE ENCOUNTER
Genetic cancer testing is quite complicated    Prudent to have patient see one of the genetic counselor for consult    I did referral    Please give patient the number to call and schedule appointment    Adeel Ramirez MD

## 2019-05-31 NOTE — TELEPHONE ENCOUNTER
Patient returned call - nurse picked up - relayed message from PCP and referral phone number, Patient verbalized understanding and repeated phone number correctly. Patient states the genetic counselor needs the referral faxed to:      Fax # [phone]  Attn: Krystyna.    Krystyna's Phone [phone] if questions.      Zoë Sampson RN  [facility]

## 2019-05-31 NOTE — TELEPHONE ENCOUNTER
ONCOLOGY INTAKE: Records Information      APPT INFORMATION:  Referring provider:  Dr. Adeel Ramirez  Referring provider s clinic:  [facility]  Reason for visit/diagnosis:  Family history of cancer [Z80.9]  Has patient been notified of appointment date and time?: NA    RECORDS INFORMATION:  Were the records received with the referral (via Rightfax)? No    ADDITIONAL INFORMATION:  Left  for Pt with hours and number. Will call Pt on [id number] if he has not been scheduled. Referral in [facility]

## 2019-06-14 ENCOUNTER — TRANSFERRED RECORDS (OUTPATIENT)
Dept: HEALTH INFORMATION MANAGEMENT | Facility: CLINIC | Age: 44
End: 2019-06-14

## 2019-06-19 ENCOUNTER — OFFICE VISIT (OUTPATIENT)
Dept: FAMILY MEDICINE | Facility: CLINIC | Age: 44
End: 2019-06-19
Payer: COMMERCIAL

## 2019-06-19 VITALS
OXYGEN SATURATION: 97 % | TEMPERATURE: 98 F | DIASTOLIC BLOOD PRESSURE: 83 MMHG | WEIGHT: 196.5 LBS | BODY MASS INDEX: 27.51 KG/M2 | HEART RATE: 58 BPM | SYSTOLIC BLOOD PRESSURE: 127 MMHG | HEIGHT: 71 IN

## 2019-06-19 DIAGNOSIS — E78.1 HIGH TRIGLYCERIDES: ICD-10-CM

## 2019-06-19 DIAGNOSIS — F41.9 ANXIETY: Primary | ICD-10-CM

## 2019-06-19 DIAGNOSIS — F32.1 MODERATE MAJOR DEPRESSION (H): ICD-10-CM

## 2019-06-19 PROCEDURE — 99213 OFFICE O/P EST LOW 20 MIN: CPT | Performed by: FAMILY MEDICINE

## 2019-06-19 ASSESSMENT — ANXIETY QUESTIONNAIRES
GAD7 TOTAL SCORE: 6
3. WORRYING TOO MUCH ABOUT DIFFERENT THINGS: NOT AT ALL
1. FEELING NERVOUS, ANXIOUS, OR ON EDGE: SEVERAL DAYS
IF YOU CHECKED OFF ANY PROBLEMS ON THIS QUESTIONNAIRE, HOW DIFFICULT HAVE THESE PROBLEMS MADE IT FOR YOU TO DO YOUR WORK, TAKE CARE OF THINGS AT HOME, OR GET ALONG WITH OTHER PEOPLE: SOMEWHAT DIFFICULT
6. BECOMING EASILY ANNOYED OR IRRITABLE: MORE THAN HALF THE DAYS
2. NOT BEING ABLE TO STOP OR CONTROL WORRYING: SEVERAL DAYS
5. BEING SO RESTLESS THAT IT IS HARD TO SIT STILL: SEVERAL DAYS
7. FEELING AFRAID AS IF SOMETHING AWFUL MIGHT HAPPEN: SEVERAL DAYS

## 2019-06-19 ASSESSMENT — PAIN SCALES - GENERAL: PAINLEVEL: NO PAIN (0)

## 2019-06-19 ASSESSMENT — PATIENT HEALTH QUESTIONNAIRE - PHQ9
5. POOR APPETITE OR OVEREATING: NOT AT ALL
SUM OF ALL RESPONSES TO PHQ QUESTIONS 1-9: 8

## 2019-06-19 ASSESSMENT — MIFFLIN-ST. JEOR: SCORE: 1822.57

## 2019-06-19 NOTE — PROGRESS NOTES
Subjective     Dannie Hart is a 43 year old male who presents to clinic today for the following health issues:    HPI   Depression and Anxiety Follow-Up    How are you doing with your depression since your last visit? Improved     How are you doing with your anxiety since your last visit?  Improved     Are you having other symptoms that might be associated with depression or anxiety? No    Have you had a significant life event? No     Do you have any concerns with your use of alcohol or other drugs? No    Social History     Tobacco Use     Smoking status: Never Smoker     Smokeless tobacco: Never Used   Substance Use Topics     Alcohol use: Yes     Comment: socially     Drug use: No     PHQ 10/15/2018 12/7/2018 5/30/2019   PHQ-9 Total Score 20 6 8   Q9: Thoughts of better off dead/self-harm past 2 weeks Not at all Not at all Not at all     EVELYN-7 SCORE 10/15/2018 12/7/2018   Total Score 21 5     No flowsheet data found.  No flowsheet data found.         Suicide Assessment Five-step Evaluation and Treatment (SAFE-T)    Amount of exercise or physical activity: 2-3 days/week for an average of 45-60 minutes    Problems taking medications regularly: No    Medication side effects: none    Diet: regular (no restrictions)      None      Was weaning down but then external event comes up    Mom diagnosed with breast cancer    3 days ago mother in law passed away unexpectedly    Mom has a gene mutation    Patient had testing done, waiting to hear back on that    Tingly fingers and lightheaded symptoms when med wears off    Patient was down to 1/2 pill every other day         They will have to travel to [place] etc     Exercise regularly            Reviewed and updated as needed this visit by Provider         Review of Systems   See above   Overall feeling well physically       Objective    There were no vitals taken for this visit.  There is no height or weight on file to calculate BMI.  Physical Exam   Full physical not  done     Mentation and affect are fine    No tremor of speech or extremity    Radial pulses symmetric     Diagnostic Test Results:  Labs reviewed in Epic             ASSESSMENT / PLAN:  (F41.9) Anxiety  (primary encounter diagnosis)  Comment: overall patient has been doing okay except for these stressful events recently   Plan: sertraline (ZOLOFT) 50 MG tablet        When things calm down patient wants to try to taper down and possibly off medication     (F32.1) Moderate major depression (H)  Comment: as above   Plan: sertraline (ZOLOFT) 50 MG tablet             (E78.1) High triglycerides  Comment: only mildly high   Plan: keep working on healthy diet/exercise     Patient to mail us the gene testing results       I reviewed the patient's medications, allergies, medical history, family history, and social history.    Adeel Ramirez MD

## 2019-06-19 NOTE — PATIENT INSTRUCTIONS
Okay to stay on sertraline for now    Taper down and off when / if able     Keep working on healthy diet/exercise     Mail us a copy of the gene results

## 2019-06-20 ENCOUNTER — TRANSFERRED RECORDS (OUTPATIENT)
Dept: HEALTH INFORMATION MANAGEMENT | Facility: CLINIC | Age: 44
End: 2019-06-20

## 2019-06-20 ASSESSMENT — ANXIETY QUESTIONNAIRES: GAD7 TOTAL SCORE: 6

## 2019-06-25 PROBLEM — E78.1 HIGH TRIGLYCERIDES: Status: ACTIVE | Noted: 2019-06-25

## 2019-10-10 DIAGNOSIS — F32.1 MODERATE MAJOR DEPRESSION (H): ICD-10-CM

## 2019-10-10 DIAGNOSIS — F41.9 ANXIETY: ICD-10-CM

## 2019-10-10 NOTE — TELEPHONE ENCOUNTER
"Requested Prescriptions   Pending Prescriptions Disp Refills     sertraline (ZOLOFT) 50 MG tablet [Pharmacy Med Name: SERTRALINE 50MG TABLETS] 30 tablet 0     Sig: TAKE 1 TABLET(50 MG) BY MOUTH DAILY   Last Written Prescription Date:  6-19-19  Last Fill Quantity: 30,  # refills: 2   Last office visit: 6/19/2019 with prescribing provider:  6-19-19   Future Office Visit:        SSRIs Protocol Failed - 10/10/2019 12:32 PM        Failed - PHQ-9 score less than 5 in past 6 months     Please review last PHQ-9 score.           Passed - Medication is active on med list        Passed - Patient is age 18 or older        Passed - Recent (6 mo) or future (30 days) visit within the authorizing provider's specialty     Patient had office visit in the last 6 months or has a visit in the next 30 days with authorizing provider or within the authorizing provider's specialty.  See \"Patient Info\" tab in inbasket, or \"Choose Columns\" in Meds & Orders section of the refill encounter.              "

## 2019-10-11 NOTE — TELEPHONE ENCOUNTER
PHQ-9 score:    PHQ-9 SCORE 6/19/2019   PHQ-9 Total Score 8     See patient instructions at last visit 6/19/19, patient was having acute issues causing anxiety/depression:    Instructions     Okay to stay on sertraline for now     Taper down and off when / if able         I called patient, he answered, states he can talk if it is quick (so phone PHQ9 out of the question).  He states he did taper down but then ended up needing to go back on 1 tab daily.   States he is doing well at that dose.   He is planning to follow up in December.    Prescription approved per [facility] Refill Protocol.    Nemo Zarco RN  [facility]

## 2019-11-12 ENCOUNTER — OFFICE VISIT (OUTPATIENT)
Dept: FAMILY MEDICINE | Facility: CLINIC | Age: 44
End: 2019-11-12
Payer: COMMERCIAL

## 2019-11-12 VITALS
BODY MASS INDEX: 27.85 KG/M2 | TEMPERATURE: 97.8 F | WEIGHT: 201.13 LBS | DIASTOLIC BLOOD PRESSURE: 75 MMHG | HEART RATE: 73 BPM | SYSTOLIC BLOOD PRESSURE: 129 MMHG

## 2019-11-12 DIAGNOSIS — R53.83 FATIGUE, UNSPECIFIED TYPE: ICD-10-CM

## 2019-11-12 DIAGNOSIS — F32.1 MODERATE MAJOR DEPRESSION (H): ICD-10-CM

## 2019-11-12 DIAGNOSIS — E78.1 HIGH TRIGLYCERIDES: ICD-10-CM

## 2019-11-12 DIAGNOSIS — F41.9 ANXIETY: ICD-10-CM

## 2019-11-12 DIAGNOSIS — Z00.00 ENCOUNTER FOR PREVENTATIVE ADULT HEALTH CARE EXAMINATION: Primary | ICD-10-CM

## 2019-11-12 DIAGNOSIS — Z12.5 SCREENING FOR PROSTATE CANCER: ICD-10-CM

## 2019-11-12 LAB
ALBUMIN SERPL-MCNC: 4 G/DL (ref 3.4–5)
ALP SERPL-CCNC: 85 U/L (ref 40–150)
ALT SERPL W P-5'-P-CCNC: 34 U/L (ref 0–70)
ANION GAP SERPL CALCULATED.3IONS-SCNC: 4 MMOL/L (ref 3–14)
AST SERPL W P-5'-P-CCNC: 21 U/L (ref 0–45)
BASOPHILS # BLD AUTO: 0 10E9/L (ref 0–0.2)
BASOPHILS NFR BLD AUTO: 0.5 %
BILIRUB SERPL-MCNC: 0.7 MG/DL (ref 0.2–1.3)
BUN SERPL-MCNC: 17 MG/DL (ref 7–30)
CALCIUM SERPL-MCNC: 9.1 MG/DL (ref 8.5–10.1)
CHLORIDE SERPL-SCNC: 106 MMOL/L (ref 94–109)
CHOLEST SERPL-MCNC: 169 MG/DL
CO2 SERPL-SCNC: 31 MMOL/L (ref 20–32)
CREAT SERPL-MCNC: 0.95 MG/DL (ref 0.66–1.25)
DIFFERENTIAL METHOD BLD: NORMAL
EOSINOPHIL # BLD AUTO: 0.1 10E9/L (ref 0–0.7)
EOSINOPHIL NFR BLD AUTO: 2.5 %
ERYTHROCYTE [DISTWIDTH] IN BLOOD BY AUTOMATED COUNT: 12.8 % (ref 10–15)
GFR SERPL CREATININE-BSD FRML MDRD: >90 ML/MIN/{1.73_M2}
GLUCOSE SERPL-MCNC: 86 MG/DL (ref 70–99)
HCT VFR BLD AUTO: 48 % (ref 40–53)
HDLC SERPL-MCNC: 37 MG/DL
HGB BLD-MCNC: 16 G/DL (ref 13.3–17.7)
LDLC SERPL CALC-MCNC: 85 MG/DL
LYMPHOCYTES # BLD AUTO: 1.9 10E9/L (ref 0.8–5.3)
LYMPHOCYTES NFR BLD AUTO: 33 %
MCH RBC QN AUTO: 29.6 PG (ref 26.5–33)
MCHC RBC AUTO-ENTMCNC: 33.3 G/DL (ref 31.5–36.5)
MCV RBC AUTO: 89 FL (ref 78–100)
MONOCYTES # BLD AUTO: 0.5 10E9/L (ref 0–1.3)
MONOCYTES NFR BLD AUTO: 7.9 %
NEUTROPHILS # BLD AUTO: 3.2 10E9/L (ref 1.6–8.3)
NEUTROPHILS NFR BLD AUTO: 56.1 %
NONHDLC SERPL-MCNC: 132 MG/DL
PLATELET # BLD AUTO: 254 10E9/L (ref 150–450)
POTASSIUM SERPL-SCNC: 4.2 MMOL/L (ref 3.4–5.3)
PROT SERPL-MCNC: 7.5 G/DL (ref 6.8–8.8)
PSA SERPL-ACNC: 3.3 UG/L (ref 0–4)
RBC # BLD AUTO: 5.41 10E12/L (ref 4.4–5.9)
SODIUM SERPL-SCNC: 141 MMOL/L (ref 133–144)
TRIGL SERPL-MCNC: 234 MG/DL
TSH SERPL DL<=0.005 MIU/L-ACNC: 2.24 MU/L (ref 0.4–4)
WBC # BLD AUTO: 5.7 10E9/L (ref 4–11)

## 2019-11-12 PROCEDURE — G0103 PSA SCREENING: HCPCS | Performed by: FAMILY MEDICINE

## 2019-11-12 PROCEDURE — 99396 PREV VISIT EST AGE 40-64: CPT | Performed by: FAMILY MEDICINE

## 2019-11-12 PROCEDURE — 80050 GENERAL HEALTH PANEL: CPT | Performed by: FAMILY MEDICINE

## 2019-11-12 PROCEDURE — 80061 LIPID PANEL: CPT | Performed by: FAMILY MEDICINE

## 2019-11-12 PROCEDURE — 36415 COLL VENOUS BLD VENIPUNCTURE: CPT | Performed by: FAMILY MEDICINE

## 2019-11-12 RX ORDER — SERTRALINE HYDROCHLORIDE 100 MG/1
100 TABLET, FILM COATED ORAL DAILY
Qty: 90 TABLET | Refills: 3 | Status: SHIPPED | OUTPATIENT
Start: 2019-11-12 | End: 2020-09-15

## 2019-11-12 ASSESSMENT — ANXIETY QUESTIONNAIRES
2. NOT BEING ABLE TO STOP OR CONTROL WORRYING: MORE THAN HALF THE DAYS
GAD7 TOTAL SCORE: 16
IF YOU CHECKED OFF ANY PROBLEMS ON THIS QUESTIONNAIRE, HOW DIFFICULT HAVE THESE PROBLEMS MADE IT FOR YOU TO DO YOUR WORK, TAKE CARE OF THINGS AT HOME, OR GET ALONG WITH OTHER PEOPLE: SOMEWHAT DIFFICULT
7. FEELING AFRAID AS IF SOMETHING AWFUL MIGHT HAPPEN: SEVERAL DAYS
3. WORRYING TOO MUCH ABOUT DIFFERENT THINGS: MORE THAN HALF THE DAYS
5. BEING SO RESTLESS THAT IT IS HARD TO SIT STILL: NEARLY EVERY DAY
6. BECOMING EASILY ANNOYED OR IRRITABLE: MORE THAN HALF THE DAYS
1. FEELING NERVOUS, ANXIOUS, OR ON EDGE: NEARLY EVERY DAY

## 2019-11-12 ASSESSMENT — ENCOUNTER SYMPTOMS
DIARRHEA: 0
DIZZINESS: 0
HEMATOCHEZIA: 0
CHILLS: 0
COUGH: 0
ABDOMINAL PAIN: 0
FEVER: 0
HEMATURIA: 0
NERVOUS/ANXIOUS: 1
CONSTIPATION: 0
EYE PAIN: 0

## 2019-11-12 ASSESSMENT — PATIENT HEALTH QUESTIONNAIRE - PHQ9
SUM OF ALL RESPONSES TO PHQ QUESTIONS 1-9: 16
5. POOR APPETITE OR OVEREATING: NEARLY EVERY DAY

## 2019-11-12 ASSESSMENT — PAIN SCALES - GENERAL: PAINLEVEL: NO PAIN (0)

## 2019-11-12 NOTE — PROGRESS NOTES
SUBJECTIVE:   CC: Dannie Hart is an 43 year old male who presents for preventative health visit.     Healthy Habits:     Getting at least 3 servings of Calcium per day:  Yes    Bi-annual eye exam:  Yes    Dental care twice a year:  Yes    Sleep apnea or symptoms of sleep apnea:  Daytime drowsiness    Diet:  Regular (no restrictions)    Frequency of exercise:  2-3 days/week    Duration of exercise:  15-30 minutes    Taking medications regularly:  Yes    Medication side effects:  None    PHQ-2 Total Score: 2    Additional concerns today:  Yes          none    Today's PHQ-2 Score:   PHQ-2 ( 1999 Pfizer) 11/12/2019   Q1: Little interest or pleasure in doing things 1   Q2: Feeling down, depressed or hopeless 1   PHQ-2 Score 2   Q1: Little interest or pleasure in doing things Several days   Q2: Feeling down, depressed or hopeless Several days   PHQ-2 Score 2       Abuse: Current or Past(Physical, Sexual or Emotional)- No  Do you feel safe in your environment? Yes        Social History     Tobacco Use     Smoking status: Never Smoker     Smokeless tobacco: Never Used   Substance Use Topics     Alcohol use: Yes     Comment: socially     If you drink alcohol do you typically have >3 drinks per day or >7 drinks per week? No    Alcohol Use 11/12/2019   Prescreen: >3 drinks/day or >7 drinks/week? No   Prescreen: >3 drinks/day or >7 drinks/week? -   No flowsheet data found.    Last PSA:   PSA   Date Value Ref Range Status   09/11/2018 2.35 0 - 4 ug/L Final     Comment:     Assay Method:  Chemiluminescence using Siemens Vista analyzer       Reviewed orders with patient. Reviewed health maintenance and updated orders accordingly - Yes       Reviewed and updated as needed this visit by clinical staff  Tobacco  Allergies  Meds  Med Hx  Surg Hx  Fam Hx  Soc Hx        Reviewed and updated as needed this visit by Provider            Review of Systems   Constitutional: Negative for chills and fever.   HENT: Positive for  congestion. Negative for ear pain.    Eyes: Negative for pain.   Respiratory: Negative for cough.    Cardiovascular: Negative for chest pain.   Gastrointestinal: Negative for abdominal pain, constipation, diarrhea and hematochezia.   Genitourinary: Negative for hematuria.   Neurological: Negative for dizziness.   Psychiatric/Behavioral: The patient is nervous/anxious.      Anxiety flaring up again  Not as bad as last fall    About a month ago started again    Son struggling some in school    Still doing grad classes    Teaching full time    Not as much exercise as would like    No coaching etc    Obsess over little things    2 kids healthy    Patient thinks the sertraline is helping    Still enjoy teaching    The extra work is put off sometimes    10 mg melatonin at night          OBJECTIVE:   /75 (BP Location: Right arm, Patient Position: Chair, Cuff Size: Adult Regular)   Pulse 73   Temp 97.8  F (36.6  C) (Oral)   Wt 91.2 kg (201 lb 2 oz)   BMI 27.85 kg/m      Physical Exam  GENERAL: healthy, alert and no distress  EYES: Eyes grossly normal to inspection, PERRL and conjunctivae and sclerae normal  HENT: ear canals and TM's normal, nose and mouth without ulcers or lesions  NECK: no adenopathy, no asymmetry, masses, or scars and thyroid normal to palpation  RESP: lungs clear to auscultation - no rales, rhonchi or wheezes  CV: regular rate and rhythm, normal S1 S2, no S3 or S4, no murmur, click or rub, no peripheral edema and peripheral pulses strong  ABDOMEN: soft, nontender, no hepatosplenomegaly, no masses and bowel sounds normal  MS: no gross musculoskeletal defects noted, no edema  SKIN: no suspicious lesions or rashes  NEURO: Normal strength and tone, mentation intact and speech normal  PSYCH: mentation appears normal, affect normal/bright    Diagnostic Test Results:  Labs reviewed in Epic    ASSESSMENT/PLAN:   Dannie was seen today for physical and health maintenance.    Diagnoses and all orders for  "this visit:    Encounter for preventative adult health care examination    Anxiety  -     MENTAL HEALTH REFERRAL  - Adult; Outpatient Treatment; Individual/Couples/Family/Group Therapy/Health Psychology; [facility] [place] [phone]; We will contact you to schedule the appointment or please call with any questions  -     sertraline (ZOLOFT) 100 MG tablet; Take 1 tablet (100 mg) by mouth daily    Moderate major depression (H)  -     MENTAL HEALTH REFERRAL  - Adult; Outpatient Treatment; Individual/Couples/Family/Group Therapy/Health Psychology; G: [facility] [phone]; We will contact you to schedule the appointment or please call with any questions  -     sertraline (ZOLOFT) 100 MG tablet; Take 1 tablet (100 mg) by mouth daily    Screening for prostate cancer  -     Prostate spec antigen screen    Fatigue, unspecified type  -     TSH with free T4 reflex  -     CBC with platelets differential    High triglycerides  -     Comprehensive metabolic panel  -     Lipid panel reflex to direct LDL Non-fasting    Discussed several things with patient  Increase sertraline to 100 mg daily, call with problems/ side effects  See us in 2-3 months to recheck  Patient to call and schedule counseling/ therapy appointment   Increase exercise  Check labs       COUNSELING:   Reviewed preventive health counseling, as reflected in patient instructions       Regular exercise       Healthy diet/nutrition       Vision screening       Family planning       Safe sex practices/STD prevention       Prostate cancer screening    Estimated body mass index is 27.85 kg/m  as calculated from the following:    Height as of 6/19/19: 1.81 m (5' 11.26\").    Weight as of this encounter: 91.2 kg (201 lb 2 oz).     Weight management plan: Discussed healthy diet and exercise guidelines     reports that he has never smoked. He has never used smokeless tobacco.      Counseling Resources:  ATP IV Guidelines  Pooled " Cohorts Equation Calculator  FRAX Risk Assessment  ICSI Preventive Guidelines  Dietary Guidelines for Americans, 2010  USDA's MyPlate  ASA Prophylaxis  Lung CA Screening    Adeel Ramirez MD  [facility]

## 2019-11-12 NOTE — PATIENT INSTRUCTIONS
Increase sertraline to 100 mg daily; call with problems/ side effects    Increase exercise     Call for counseling / therapy appointment    We will send you lab results    Return to clinic in 2-3 months

## 2019-11-12 NOTE — LETTER
[facility]  [address] [place]  [address]  [phone]        November 14, 2019    Dannie Hart  [address] [facility] [id number]        Dear Dannie,    Triglycerides are moderately elevated.  Keep working on healthy diet/exercise.     Other labs are all okay.     Results for orders placed or performed in visit on 11/12/19   TSH with free T4 reflex     Status: None   Result Value Ref Range    TSH 2.24 0.40 - 4.00 mU/L   CBC with platelets differential     Status: None   Result Value Ref Range    WBC 5.7 4.0 - 11.0 10e9/L    RBC Count 5.41 4.4 - 5.9 10e12/L    Hemoglobin 16.0 13.3 - 17.7 g/dL    Hematocrit 48.0 40.0 - 53.0 %    MCV 89 78 - 100 fl    MCH 29.6 26.5 - 33.0 pg    MCHC 33.3 31.5 - 36.5 g/dL    RDW 12.8 10.0 - 15.0 %    Platelet Count 254 150 - 450 10e9/L    % Neutrophils 56.1 %    % Lymphocytes 33.0 %    % Monocytes 7.9 %    % Eosinophils 2.5 %    % Basophils 0.5 %    Absolute Neutrophil 3.2 1.6 - 8.3 10e9/L    Absolute Lymphocytes 1.9 0.8 - 5.3 10e9/L    Absolute Monocytes 0.5 0.0 - 1.3 10e9/L    Absolute Eosinophils 0.1 0.0 - 0.7 10e9/L    Absolute Basophils 0.0 0.0 - 0.2 10e9/L    Diff Method Automated Method    Comprehensive metabolic panel     Status: None   Result Value Ref Range    Sodium 141 133 - 144 mmol/L    Potassium 4.2 3.4 - 5.3 mmol/L    Chloride 106 94 - 109 mmol/L    Carbon Dioxide 31 20 - 32 mmol/L    Anion Gap 4 3 - 14 mmol/L    Glucose 86 70 - 99 mg/dL    Urea Nitrogen 17 7 - 30 mg/dL    Creatinine 0.95 0.66 - 1.25 mg/dL    GFR Estimate >90 >60 mL/min/[1.73_m2]    GFR Estimate If Black >90 >60 mL/min/[1.73_m2]    Calcium 9.1 8.5 - 10.1 mg/dL    Bilirubin Total 0.7 0.2 - 1.3 mg/dL    Albumin 4.0 3.4 - 5.0 g/dL    Protein Total 7.5 6.8 - 8.8 g/dL    Alkaline Phosphatase 85 40 - 150 U/L    ALT 34 0 - 70 U/L    AST 21 0 - 45 U/L   Prostate spec antigen screen     Status: None   Result Value Ref Range    PSA 3.30 0 - 4 ug/L   Lipid  panel reflex to direct LDL Non-fasting     Status: Abnormal   Result Value Ref Range    Cholesterol 169 <200 mg/dL    Triglycerides 234 (H) <150 mg/dL    HDL Cholesterol 37 (L) >39 mg/dL    LDL Cholesterol Calculated 85 <100 mg/dL    Non HDL Cholesterol 132 (H) <130 mg/dL       If you have any questions please call the clinic at [phone].    Sincerely,    Adeel Ramirez MD, LMD

## 2019-11-13 ASSESSMENT — ANXIETY QUESTIONNAIRES: GAD7 TOTAL SCORE: 16

## 2019-12-19 ENCOUNTER — OFFICE VISIT (OUTPATIENT)
Dept: PSYCHOLOGY | Facility: CLINIC | Age: 44
End: 2019-12-19
Attending: FAMILY MEDICINE
Payer: COMMERCIAL

## 2019-12-19 DIAGNOSIS — F43.22 ADJUSTMENT DISORDER WITH ANXIETY: Primary | ICD-10-CM

## 2019-12-19 PROCEDURE — 90791 PSYCH DIAGNOSTIC EVALUATION: CPT | Performed by: SOCIAL WORKER

## 2019-12-19 ASSESSMENT — ANXIETY QUESTIONNAIRES
GAD7 TOTAL SCORE: 8
4. TROUBLE RELAXING: SEVERAL DAYS
6. BECOMING EASILY ANNOYED OR IRRITABLE: SEVERAL DAYS
IF YOU CHECKED OFF ANY PROBLEMS ON THIS QUESTIONNAIRE, HOW DIFFICULT HAVE THESE PROBLEMS MADE IT FOR YOU TO DO YOUR WORK, TAKE CARE OF THINGS AT HOME, OR GET ALONG WITH OTHER PEOPLE: SOMEWHAT DIFFICULT
5. BEING SO RESTLESS THAT IT IS HARD TO SIT STILL: SEVERAL DAYS
3. WORRYING TOO MUCH ABOUT DIFFERENT THINGS: SEVERAL DAYS
1. FEELING NERVOUS, ANXIOUS, OR ON EDGE: MORE THAN HALF THE DAYS
7. FEELING AFRAID AS IF SOMETHING AWFUL MIGHT HAPPEN: SEVERAL DAYS
2. NOT BEING ABLE TO STOP OR CONTROL WORRYING: SEVERAL DAYS

## 2019-12-19 ASSESSMENT — COLUMBIA-SUICIDE SEVERITY RATING SCALE - C-SSRS
ATTEMPT PAST THREE MONTHS: NO
TOTAL  NUMBER OF ABORTED OR SELF INTERRUPTED ATTEMPTS PAST 3 MONTHS: NO
5. HAVE YOU STARTED TO WORK OUT OR WORKED OUT THE DETAILS OF HOW TO KILL YOURSELF? DO YOU INTEND TO CARRY OUT THIS PLAN?: NO
4. HAVE YOU HAD THESE THOUGHTS AND HAD SOME INTENTION OF ACTING ON THEM?: NO
TOTAL  NUMBER OF INTERRUPTED ATTEMPTS PAST 3 MONTHS: NO
3. HAVE YOU BEEN THINKING ABOUT HOW YOU MIGHT KILL YOURSELF?: NO
ATTEMPT LIFETIME: NO
1. IN THE PAST MONTH, HAVE YOU WISHED YOU WERE DEAD OR WISHED YOU COULD GO TO SLEEP AND NOT WAKE UP?: NO
6. HAVE YOU EVER DONE ANYTHING, STARTED TO DO ANYTHING, OR PREPARED TO DO ANYTHING TO END YOUR LIFE?: NO
TOTAL  NUMBER OF ABORTED OR SELF INTERRUPTED ATTEMPTS PAST LIFETIME: NO
1. IN THE PAST MONTH, HAVE YOU WISHED YOU WERE DEAD OR WISHED YOU COULD GO TO SLEEP AND NOT WAKE UP?: NO
4. HAVE YOU HAD THESE THOUGHTS AND HAD SOME INTENTION OF ACTING ON THEM?: NO
TOTAL  NUMBER OF INTERRUPTED ATTEMPTS LIFETIME: NO
2. HAVE YOU ACTUALLY HAD ANY THOUGHTS OF KILLING YOURSELF?: NO
6. HAVE YOU EVER DONE ANYTHING, STARTED TO DO ANYTHING, OR PREPARED TO DO ANYTHING TO END YOUR LIFE?: NO
2. HAVE YOU ACTUALLY HAD ANY THOUGHTS OF KILLING YOURSELF LIFETIME?: NO
5. HAVE YOU STARTED TO WORK OUT OR WORKED OUT THE DETAILS OF HOW TO KILL YOURSELF? DO YOU INTEND TO CARRY OUT THIS PLAN?: NO

## 2019-12-19 ASSESSMENT — PATIENT HEALTH QUESTIONNAIRE - PHQ9: SUM OF ALL RESPONSES TO PHQ QUESTIONS 1-9: 6

## 2019-12-19 NOTE — Clinical Note
Inga Ramirez, Your patient presented to [facility] for a diagnostic evaluation today.  They will be beginning individual therapy with me.  Please do not hesitate to contact me if you have any questions in regards to Dannie Hart's care.    Zelda Salazar, [facility]

## 2019-12-19 NOTE — PROGRESS NOTES
"[facility]    PATIENT'S NAME: Dannie Hart  PREFERRED NAME: Dannie  PREFERRED PRONOUNS: He/Him/His/Himself  MRN:   [id number]  :   1975  ACCT. NUMBER: [id number]  DATE OF SERVICE: 19  START TIME:1009  END TIME: 1100  PREFERRED PHONE: [phone]  May we leave a program related message: Yes    STANDARD DIAGNOSTIC ASSESSMENT    VIDEO VISIT: No    Identifying Information:  Patient is a 44 year old, .  The pronoun use throughout this assessment reflects the sex of the patient at birth.  Patient was referred for an assessment by self.  Patient attended the session alone.     The patient describes their cultural background as [place] citizen, with report of cultural bias as a stressor.  Cultural influences and impact on patient's life structure, values, norms, and healthcare: N/A  The patient reports there are no ethnic, cultural or Lutheran factors that may be relevant for therapy.  Patient identified his preferred language to be English. Patient reported he does not need the assistance of an  or other support involved in therapy. Modifications will not be used to assist communication in therapy.   Patient reports he is able to understand written materials.    Chief Complaint:   The reason for seeking services at this time is: \" anxiety \"      History of Presenting Concern:  The problem(s) began last September. Patient has not attempted to resolve these concerns in the past. Patient reports that other professional(s) are not currently involved in providing support / services.      Social/Family History:  Patient reported he grew up in [place], [place].  They were raised by biological parents.  They were the first born of 2 children.  This is an intact family and parents remain  Patient reported that his childhood was very good.  Patient described his current relationships with family of origin as good.      Patient's highest education level was graduate " school. Patient did not identify any learning problems.   Patient reported the following relationship history.  Patient's current relationship status is .   Patient identified  their sexual orientation as heterosexual.  Patient reported having 2 children.     Patient's current living/housing situation involves owning a home.  Patient identified partner, parents and friends as part of their support system.  Patient identified the quality of these relationships as good.      Patient is currently employed full time and reports they are able to function appropriately at work.  Patient did not serve in the .  Patient reports their finances are obtained through employment and partner.  Patient does not identify finances as a current stressor.      Patient reported that he has not been involved with the legal system.   Patient denies being on probation / parole / under the jurisdiction of the court.    Medical Issues:  Patient reports family history includes Blood Disease in his mother; Breast Cancer in his maternal grandmother and mother; Colon Polyps in his mother; Coronary Artery Disease Early Onset in his maternal grandfather; Diabetes in his mother; Other Cancer (age of onset: 35) in his father; Other Cancer (age of onset: 50) in his mother; Prostate Cancer in his paternal grandmother.    Patient has had a physical exam to rule out medical causes for current symptoms.  Date of last physical exam was within the past year. Client was encouraged to follow up with PCP if symptoms were to develop. The patient has a [facility] Primary Care Provider, who is named Adeel Ramirez.  Patient reports no current medical concerns.  They did not report dental concerns.  There are not significant appetite / nutritional concerns / weight changes.  The patient has not been diagnosed with an eating disorder.  The patient denies the presence of chronic or episodic pain.  Patient does not report a history of head injury /  trauma / cognitive impairment.      Patient reports current meds as: sertraline      Medication Adherence:  Patient reports taking prescribed medications as prescribed    Patient Allergies:  Allergies   Allergen Reactions     Hydrocodone-Acetaminophen Anxiety and Nausea       Medical History:  No past medical history on file.    Mental Health History:  Patient did report a family history of mental health concerns; see medical history section for details.  Patient previously received the following mental health diagnosis: Anxiety.  Patient reported symptoms began last fall.   Patient has received the following mental health services in the past: none.  Hospitalizations: None.  Patient denies a history of civil commitment.  Patient is not currently receiving any mental health services.        Current Mental Status Exam:   Appearance:  Appropriate   Eye Contact:  Good   Psychomotor:  Normal       Gait / station:  no problem  Attitude / Demeanor: Cooperative   Speech      Rate / Production: Normal       Volume:  Normal  volume      Language:  Rate/Production: Normal    Mood:   Anxious   Affect:   Worrisome   Thought Content: Clear   Thought Process: Coherent       Associations: Volume: Normal    Insight:   Fair   Judgment:  Intact   Orientation:  All  Attention/concentration: Good      Review of Symptoms:  Depression: Change in sleep, Lack of interest, Change in energy level, Difficulties concentrating, Change in appetite, Low self-worth, Ruminations, Irritability, Feling sad, down, or depressed and Withdrawn  Eileen:  No Symptoms  Psychosis: No Symptoms  Anxiety: Excessive worry, Nervousness, Physical complaints, such as headaches, stomachaches, muscle tension, Separation anxiety, Social anxiety, Sleep disturbance, Psychomotor agitation, Ruminations, Poor concentration, Irritaiblity and Anger outbursts  Panic:  No symptoms  Post Traumatic Stress Disorder: No Symptoms  Eating Disorder: No Symptoms  Oppositional Defiant  Disorder:  No Symptoms  ADD / ADHD:  No symptoms  Conduct Disorder: No symptoms  Autism Spectrum Disorder: No symptoms  Obsessive Compulsive Disorder: No Symptoms  Other Compulsive Behaviors: N/A   Substance Use: n/A    Rating Scales:  PHQ9     PHQ-9 SCORE 6/19/2019 11/12/2019 12/19/2019   PHQ-9 Total Score 8 16 6     GAD7     EVELYN-7 SCORE 6/19/2019 11/12/2019 12/19/2019   Total Score 6 16 8     CGI   Clinical Global Impressions  Initial result:   4     Most recent result:  4       Substance Use History:  Patient did not report a family history of substance use concerns; see medical history section for details.  Patient has not received chemical dependency treatment in the past.  Patient has not ever been to detox.      Patient is not currently receiving any chemical dependency treatment. Patient reported the following problems as a result of their substance use: N/A.     Patient reports using alcohol 3 times per week and has 3 beers, glasses of wine and mixed drinks at a time. Patient first started drinking at age 23.  Patient reported date of last use was last week.  Patient reports heaviest use was younger.  Patient denies using tobacco.  Patient denies using marijuana.  Patient reports using caffeine 2 times per day and drinks 2 at a time. Patient started using caffeine at age 17.  Patient denies cocaine/crack use.  Patient denies meth/amphetamine use.  Patient denies use of heroin  Patient denies use of other opiates.  Patient denies inhalant use  Patient denies use of benzodiazepines.  Patient denies use of hallucinogens.  Patient denies use of barbiturates, sedatives, or hypnotics.  Patient denies use of over the counter drugs.  Patient denies use of other substances.    CAGE-AID Total Score 12/19/2019   Total Score 0       Patient is not concerned about substance use.       Based on the negative CAGE score and clinical interview there  are not indications of drug or alcohol abuse.    Significant Losses /  Trauma / Abuse / Neglect Issues:   There are indications or report of significant loss, trauma, abuse or neglect issues related to: death of mother and father in-law    Concerns for possible neglect are not present.     Safety Assessment:  Current Safety Concerns:  [place] Suicide Severity Rating Scale (Lifetime/Recent)  [place] Suicide Severity Rating (Lifetime/Recent) 12/19/2019   1. Wish to be Dead (Lifetime) No   1. Wish to be Dead (Recent) No   2. Non-Specific Active Suicidal Thoughts (Lifetime) No   2. Non-Specific Active Suicidal Thoughts (Recent) No   3. Active Suicidal Ideation with any Methods (Not Plan) Without Intent to Act (Lifetime) No   3. Active Sucidal Ideation with any Methods (Not Plan) Without Intent to Act (Recent) No   4. Active Suicidal Ideation with Some Intent to Act, Without Specific Plan (Lifetime) No   4. Active Suicidal Ideation with Some Intent to Act, Without Specific Plan (Recent) No   5. Active Suicidal Ideation with Specific Plan and Intent (Lifetime) No   5. Active Suicidal Ideation with Specific Plan and Intent (Recent) No   Actual Attempt (Lifetime) No   Actual Attempt (Past 3 Months) No   Has subject engaged in non-suicidal self-injurious behavior? (Lifetime) No   Has subject engaged in non-suicidal self-injurious behavior? (Past 3 Months) No   Interrupted Attempts (Lifetime) No   Interrupted Attempts (Past 3 Months) No   Aborted or Self-Interrupted Attempt (Lifetime) No   Aborted or Self-Interrupted Attempt (Past 3 Months) No   Preparatory Acts or Behavior (Lifetime) No   Preparatory Acts or Behavior (Past 3 Months) No     Patient denies current homicidal ideation and behaviors.  Patient denies current self-injurious ideation and behaviors.    Patient denied risk behaviors associated with substance use.  Patient denies any high risk behaviors associated with mental health symptoms.  Patient reports the following current concerns for their personal safety: None.  Patient  reports there are firearms in the house. The firearms are secured in a locked space.     History of Safety Concerns:  Patient denied a history of homicidal ideation.     Patient denied a history of self-injurious ideation and behaviors.    Patient denied a history of personal safety concerns.    Patient denied a history of assaultive behaviors.    Patient denied a history of assaultive behaviors.    Patient denied a history of risk behaviors associated with substance use.  Patient denies any history of high risk behaviors associated with mental health symptoms.    Patient reports the following protective factors: positive relationships positive social network and positive family connections, forward/future oriented thinking, dedication to family/friends, safe and stable environment, regular physical activity, secure attachment, committment to well-being and positive social skills    See Preliminary Treatment Plan for Safety and Risk Management Plan    Patient's Strengths and Limitations:  Patient identified the following strengths or resources that will help him succeed in treatment: family support, insight, intelligence, positive work environment, motivation, strong social skills and work ethic. Things that may interfere with the patient's success in treatment include: loss of family.     Diagnostic Criteria:  A. The development of emotional or behavioral symptoms in response to an identifiable stressor(s) occurring within 3 months of the onset of the stressor(s)  B. These symptoms or behaviors are clinically significant, as evidenced by one or both of the following:       - Marked distress that is out of proportion to the severity/intensity of the stressor (with consideration for external context & culture)       - Significant impairment in social, occupational, or other important areas of functioning  C. The stress-related disturbance does not meet criteria for another disorder & is not not an exacerbation of  another mental disorder  D. The symptoms do not represent normal bereavement  E. Once the stressor or its consequences have terminated, the symptoms do not persist for more than an additional 6 months       * Adjustment Disorder with Anxiety: The predominant manfestations are symptoms such as nervousness, worry, or jitteriness, or, in children separation anxiety from major attachment figures    Functional Status:  Patient's  symptoms have resulted in the following functional impairments: academic performance, childcare / parenting, health maintenance, relationship(s) and self-care    DSM5 Diagnoses: (Sustained by DSM5 Criteria Listed Above)  Diagnoses: Adjustment Disorders  309.24 (F43.22) With anxiety  Psychosocial & Contextual Factors: loss of in-laws  WHODAS:   WHODAS 2.0 Total Score 12/19/2019   Total Score 17       Preliminary Treatment Plan:  Plan for Safety and Risk Management:   Recommended that patient call 911 or go to the local ED should there be a change in any of these risk factors.     Collaboration:  Collaboration / coordination of treatment will be initiated with the following support professionals: primary care physician.    Referral to another professional/service is not indicated at this time..  A Release of Information is not needed at this time.     Patient's identified N/A    Initial Treatment will focus on: Anxiety - managing thoughts.     Resources/Service Plan:       services are not indicated.     Modifications to assist communication are not indicated.     Additional disability accomodations are not indicated     Discussed the general effects of drugs and alcohol on health and well-being. Provider gave patient printed information about the effects of chemical use on his health and well being.    Records were reviewed at time of assessment.    Report to child / adult protection services was NA.    Information in this assessment was obtained from the medical record and provided by  patient who is a good historian.     Patient will have open access to their mental health medical record.    Zelda Salazar, [facility]  December 19, 2019

## 2019-12-20 ASSESSMENT — ANXIETY QUESTIONNAIRES: GAD7 TOTAL SCORE: 8

## 2020-01-07 DIAGNOSIS — F32.1 MODERATE MAJOR DEPRESSION (H): ICD-10-CM

## 2020-01-07 DIAGNOSIS — F41.9 ANXIETY: ICD-10-CM

## 2020-01-07 NOTE — TELEPHONE ENCOUNTER
"Requested Prescriptions   Pending Prescriptions Disp Refills     sertraline (ZOLOFT) 50 MG tablet [Pharmacy Med Name: SERTRALINE 50MG TABLETS] 90 tablet 0     Sig: TAKE 1 TABLET(50 MG) BY MOUTH DAILY   Last Written Prescription Date:  11/12/19  Last Fill Quantity: 90,  # refills: 3   Last office visit: 11/12/2019 with prescribing provider:     Future Office Visit:   Next 5 appointments (look out 90 days)    Jan 27, 2020  5:30 PM CST  Return Visit with Zelda Salazar ([address] [address]  [phone]   Feb 17, 2020  5:30 PM CST  Return Visit with Zelda Salazar [facility] ([address] [address]  [phone]             SSRIs Protocol Failed - 1/7/2020  3:49 AM        Failed - PHQ-9 score less than 5 in past 6 months     Please review last PHQ-9 score.           Passed - Medication is active on med list        Passed - Patient is age 18 or older        Passed - Recent (6 mo) or future (30 days) visit within the authorizing provider's specialty     Patient had office visit in the last 6 months or has a visit in the next 30 days with authorizing provider or within the authorizing provider's specialty.  See \"Patient Info\" tab in inbasket, or \"Choose Columns\" in Meds & Orders section of the refill encounter.              "

## 2020-01-08 NOTE — TELEPHONE ENCOUNTER
Patient now taking 100mg dose. Refused.     Viv Stevens, RN, BSN, PHN  [facility]: [employer]

## 2020-03-06 ENCOUNTER — OFFICE VISIT (OUTPATIENT)
Dept: PSYCHOLOGY | Facility: CLINIC | Age: 45
End: 2020-03-06
Payer: COMMERCIAL

## 2020-03-06 DIAGNOSIS — F43.22 ADJUSTMENT DISORDER WITH ANXIETY: Primary | ICD-10-CM

## 2020-03-06 PROCEDURE — 90834 PSYTX W PT 45 MINUTES: CPT | Performed by: SOCIAL WORKER

## 2020-03-06 ASSESSMENT — ANXIETY QUESTIONNAIRES
5. BEING SO RESTLESS THAT IT IS HARD TO SIT STILL: MORE THAN HALF THE DAYS
GAD7 TOTAL SCORE: 13
4. TROUBLE RELAXING: MORE THAN HALF THE DAYS
1. FEELING NERVOUS, ANXIOUS, OR ON EDGE: MORE THAN HALF THE DAYS
3. WORRYING TOO MUCH ABOUT DIFFERENT THINGS: MORE THAN HALF THE DAYS
2. NOT BEING ABLE TO STOP OR CONTROL WORRYING: MORE THAN HALF THE DAYS
6. BECOMING EASILY ANNOYED OR IRRITABLE: MORE THAN HALF THE DAYS
IF YOU CHECKED OFF ANY PROBLEMS ON THIS QUESTIONNAIRE, HOW DIFFICULT HAVE THESE PROBLEMS MADE IT FOR YOU TO DO YOUR WORK, TAKE CARE OF THINGS AT HOME, OR GET ALONG WITH OTHER PEOPLE: SOMEWHAT DIFFICULT
7. FEELING AFRAID AS IF SOMETHING AWFUL MIGHT HAPPEN: SEVERAL DAYS

## 2020-03-06 ASSESSMENT — PATIENT HEALTH QUESTIONNAIRE - PHQ9: SUM OF ALL RESPONSES TO PHQ QUESTIONS 1-9: 14

## 2020-03-06 NOTE — PROGRESS NOTES
Progress Note    Patient Name: Dannie Hart  Date: 3/6/2020           Service Type: Individual  Video Visit: No     Session Start Time: 1205  Session End Time: 1255     Session Length: 50    Session #: 1    Attendees: Client     Treatment Plan Last Reviewed: 3/6/2020    PHQ-9 / EVELYN-7 : 14/13    DATA  Interactive Complexity: No  Crisis: No       Progress Since Last Session (Related to Symptoms / Goals / Homework):   Symptoms: first session since DA    Homework: first session since DA      Episode of Care Goals: first session since DA     Current / Ongoing Stressors and Concerns:   Side effects to anxiety     Treatment Objective(s) Addressed in This Session:   Patient will demonstrate and report a level of anxiety that can be managed at a lower level of care.  Absence of persistent anxious mood and report of reduced frequency and intensity of worry and absence of persistent anxious mood to acceptable levels, no panic attacks, report subjective comfort with rumination for a period of 90 days, within 6 months as clinically observed and by patient self-report.       Intervention:   Therapist met with patient to review goals and interventions. Therapist utilized reflected listening as patient gave brief reflection of week. Patient reported increase with anxiety and processed his symptoms along with frustrations. Therapist supported patient and guided patient through techniques for patient to name and manage his anxiety. Patient continued to report constant tingling in his hands. Therapist suggested for patient to contact doctor on medications and patient reported moving dose of medication around and desire to be off medications. Therapist supported patient and encouraged patient to not move medications until after speaking to his doctor.    Patient presented with an anxious affect. Patient was engaged in session and open to feedback. Patient reported no safety concerns.        ASSESSMENT: Current Emotional / Mental Status (status of significant symptoms):   Risk status (Self / Other harm or suicidal ideation)   Patient denies current fears or concerns for personal safety.   Patient denies current or recent suicidal ideation or behaviors.   Patient denies current or recent homicidal ideation or behaviors.   Patient denies current or recent self injurious behavior or ideation.   Patient denies other safety concerns.   Patient reports there has been no change in risk factors since their last session.     Patient reports there has been no change in protective factors since their last session.     Recommended that patient call 911 or go to the local ED should there be a change in any of these risk factors.     Appearance:   Appropriate    Eye Contact:   Good    Psychomotor Behavior: Normal    Attitude:   Cooperative    Orientation:   All   Speech    Rate / Production: Normal     Volume:  Normal    Mood:    Anxious  Depressed    Affect:    Worrisome    Thought Content:  Clear    Thought Form:  Coherent    Insight:    Fair      Medication Review:   No changes to current psychiatric medication(s)     Medication Compliance:   Yes     Changes in Health Issues:   None reported     Chemical Use Review:   Substance Use: Chemical use reviewed, no active concerns identified      Tobacco Use: No current tobacco use.      Diagnosis:  1. Adjustment disorder with anxiety        Collateral Reports Completed:   Not Applicable    PLAN: (Patient Tasks / Therapist Tasks / Other)  Patient to call doctor about medications    Sign tx plan    TOM Cochran                                                         ______________________________________________________________________    Treatment Plan    Patient's Name: Dannie Hart  YOB: 1975    Date: 3/6/2020      DSM5 Diagnoses: Adjustment Disorders  309.24 (F43.22) With anxiety  Psychosocial / Contextual Factors: Side effects to  anxiety    WHODAS: 17    Referral / Collaboration:  Referral to another professional/service is not indicated at this time..    Anticipated number of session or this episode of care: 15      MeasurableTreatment Goal(s) related to diagnosis / functional impairment(s)  Goal 1: Patient will report absence of persistent anxiety mood and report of reduced frequency and intensity of worry and absence of persistent anxious mood to acceptable levels, no panic attacks, report subjective comfort with rumination or a period of 90 days. Within 6 months as clinically observed and by patient self-report    I will know I've met my goal when side effects go away.      Objective #A (Patient Action)    Patient will demonstrate and report a level of anxiety that can be managed at a lower level of care.  Absence of persistent anxious mood and report of reduced frequency and intensity of worry and absence of persistent anxious mood to acceptable levels, no panic attacks, report subjective comfort with rumination for a period of 90 days, within 6 months as clinically observed and by patient self-report.  Status: New - Date: 3.6.20     Intervention(s)  Therapist will provide individual therapy to identify triggers to anxiety, gain feedback on helpful coping tools and thought-reframing techniques, and identify preferred way of being. Tx to include discuss current stressors and interpersonal conflicts and how to cope with these, coaching, diagnostic testing, referral for medication as indicated, use prescribed medication, cognitive restructuring, interpersonal, family therapy, supportive therapy services.                Patient has reviewed and agreed to the above plan.      Zelda Salazar, [facility]  March 6, 2020

## 2020-03-07 ASSESSMENT — ANXIETY QUESTIONNAIRES: GAD7 TOTAL SCORE: 13

## 2020-03-27 ENCOUNTER — TELEPHONE (OUTPATIENT)
Dept: PSYCHOLOGY | Facility: CLINIC | Age: 45
End: 2020-03-27

## 2020-03-30 ENCOUNTER — VIRTUAL VISIT (OUTPATIENT)
Dept: PSYCHOLOGY | Facility: CLINIC | Age: 45
End: 2020-03-30
Payer: COMMERCIAL

## 2020-03-30 DIAGNOSIS — F43.22 ADJUSTMENT DISORDER WITH ANXIETY: Primary | ICD-10-CM

## 2020-03-30 PROCEDURE — 90832 PSYTX W PT 30 MINUTES: CPT | Mod: GT | Performed by: SOCIAL WORKER

## 2020-03-30 ASSESSMENT — ANXIETY QUESTIONNAIRES
GAD7 TOTAL SCORE: 12
4. TROUBLE RELAXING: NEARLY EVERY DAY
1. FEELING NERVOUS, ANXIOUS, OR ON EDGE: MORE THAN HALF THE DAYS
5. BEING SO RESTLESS THAT IT IS HARD TO SIT STILL: MORE THAN HALF THE DAYS
IF YOU CHECKED OFF ANY PROBLEMS ON THIS QUESTIONNAIRE, HOW DIFFICULT HAVE THESE PROBLEMS MADE IT FOR YOU TO DO YOUR WORK, TAKE CARE OF THINGS AT HOME, OR GET ALONG WITH OTHER PEOPLE: SOMEWHAT DIFFICULT
6. BECOMING EASILY ANNOYED OR IRRITABLE: NEARLY EVERY DAY
2. NOT BEING ABLE TO STOP OR CONTROL WORRYING: SEVERAL DAYS
7. FEELING AFRAID AS IF SOMETHING AWFUL MIGHT HAPPEN: SEVERAL DAYS
3. WORRYING TOO MUCH ABOUT DIFFERENT THINGS: NOT AT ALL

## 2020-03-30 ASSESSMENT — PATIENT HEALTH QUESTIONNAIRE - PHQ9: SUM OF ALL RESPONSES TO PHQ QUESTIONS 1-9: 10

## 2020-03-30 NOTE — PROGRESS NOTES
Progress Note    Patient Name: Dannie Hart  Date: 3/30/2020           Service Type: Individual  Video Visit: Yes, the patient's condition can be safely assessed and treated via synchronous audio and visual telemedicine encounter.      Reason for Video Visit: Services only offered telehealth    Location of Originating Site: Patient's home    Distant Site: Provider Remote Setting     Session Start Time: 1342  Session End Time: 1413     Session Length: 31 minutes    Session #: 2    Attendees: Client     Treatment Plan Last Reviewed: 3/30/2020    PHQ-9 / EVELYN-7 : 14/13    DATA  Interactive Complexity: No  Crisis: No       Progress Since Last Session (Related to Symptoms / Goals / Homework):   Symptoms: Improving decrease PHQ-9 EVELYN-7    Homework: Partially completed      Episode of Care Goals: Minimal progress - CONTEMPLATION (Considering change and yet undecided); Intervened by assessing the negative and positive thinking (ambivalence) about behavior change     Current / Ongoing Stressors and Concerns:   COVID-19, family, side effects to anxiety     Treatment Objective(s) Addressed in This Session:   Patient will demonstrate and report a level of anxiety that can be managed at a lower level of care.  Absence of persistent anxious mood and report of reduced frequency and intensity of worry and absence of persistent anxious mood to acceptable levels, no panic attacks, report subjective comfort with rumination for a period of 90 days, within 6 months as clinically observed and by patient self-report.       Intervention:   Therapist met with patient to review goals and interventions. Therapist utilized reflected listening as patient gave brief reflection of week. Patient reported decrease in anxiety and depression and feeling restless with COVID-19 and isolation. Therapist supported patient as he processed and named pt's continued to response is to control his environment.  Therapist coached pt through effective coping skills and educated pt on the importance of pt naming/managing his anxiety in the moment.    Patient presented with an anxious affect. Patient was engaged in session and open to feedback. Patient reported no safety concerns.       ASSESSMENT: Current Emotional / Mental Status (status of significant symptoms):   Risk status (Self / Other harm or suicidal ideation)   Patient denies current fears or concerns for personal safety.   Patient denies current or recent suicidal ideation or behaviors.   Patient denies current or recent homicidal ideation or behaviors.   Patient denies current or recent self injurious behavior or ideation.   Patient denies other safety concerns.    Patient reports there has been no change in risk factors since their last session.     Patient reports there has been no change in protective factors since their last session.     Recommended that patient call 911 or go to the local ED should there be a change in any of these risk factors.     Appearance:   Appropriate    Eye Contact:   Fair    Psychomotor Behavior: Restless    Attitude:   Cooperative  Indifferent   Orientation:   All   Speech    Rate / Production: Normal/ Responsive    Volume:  Normal    Mood:    Anxious  Depressed    Affect:    Worrisome    Thought Content:  Clear    Thought Form:  Coherent    Insight:    Fair      Medication Review:   Changes to psychiatric medications, see updated Medication List in EPIC.      Medication Compliance:   Yes     Changes in Health Issues:   None reported     Chemical Use Review:   Substance Use: Chemical use reviewed, no active concerns identified      Tobacco Use: No current tobacco use.      Diagnosis:  1. Adjustment disorder with anxiety        Collateral Reports Completed:   Not Applicable    PLAN: (Patient Tasks / Therapist Tasks / Other)  Patient to call doctor about medications  Pt to name/manage his anxiety in the moment  Sign tx plan    Zelda  BRAYANSonia Salazar, LICSW                                                         ______________________________________________________________________    Treatment Plan    Patient's Name: Dannie Hart  YOB: 1975    Date: 3/6/2020      DSM5 Diagnoses: Adjustment Disorders  309.24 (F43.22) With anxiety  Psychosocial / Contextual Factors: Side effects to anxiety    WHODAS: 17    Referral / Collaboration:  Referral to another professional/service is not indicated at this time..    Anticipated number of session or this episode of care: 15      MeasurableTreatment Goal(s) related to diagnosis / functional impairment(s)  Goal 1: Patient will report absence of persistent anxiety mood and report of reduced frequency and intensity of worry and absence of persistent anxious mood to acceptable levels, no panic attacks, report subjective comfort with rumination or a period of 90 days. Within 6 months as clinically observed and by patient self-report    I will know I've met my goal when side effects go away.      Objective #A (Patient Action)    Patient will demonstrate and report a level of anxiety that can be managed at a lower level of care.  Absence of persistent anxious mood and report of reduced frequency and intensity of worry and absence of persistent anxious mood to acceptable levels, no panic attacks, report subjective comfort with rumination for a period of 90 days, within 6 months as clinically observed and by patient self-report.  Status: New - Date: 3.6.20     Intervention(s)  Therapist will provide individual therapy to identify triggers to anxiety, gain feedback on helpful coping tools and thought-reframing techniques, and identify preferred way of being. Tx to include discuss current stressors and interpersonal conflicts and how to cope with these, coaching, diagnostic testing, referral for medication as indicated, use prescribed medication, cognitive restructuring, interpersonal, family therapy,  supportive therapy services.                Patient has reviewed and agreed to the above plan.      Zelda Salazar, [facility]  March 6, 2020

## 2020-03-31 ASSESSMENT — ANXIETY QUESTIONNAIRES: GAD7 TOTAL SCORE: 12

## 2020-09-04 ENCOUNTER — TRANSFERRED RECORDS (OUTPATIENT)
Dept: HEALTH INFORMATION MANAGEMENT | Facility: CLINIC | Age: 45
End: 2020-09-04

## 2020-09-04 LAB
ALT SERPL-CCNC: 41 U/L
AST SERPL-CCNC: 39 U/L (ref 17–59)
CREAT SERPL-MCNC: 0.91 MG/DL (ref 0.66–1.25)
GFR SERPL CREATININE-BSD FRML MDRD: >60 ML/MIN/1.73M2
GLUCOSE SERPL-MCNC: 110 MG/DL (ref 75–100)
POTASSIUM SERPL-SCNC: 4.2 MMOL/L (ref 3.5–5.1)

## 2020-09-10 ENCOUNTER — VIRTUAL VISIT (OUTPATIENT)
Dept: PSYCHOLOGY | Facility: CLINIC | Age: 45
End: 2020-09-10
Payer: COMMERCIAL

## 2020-09-10 DIAGNOSIS — F43.22 ADJUSTMENT DISORDER WITH ANXIETY: Primary | ICD-10-CM

## 2020-09-10 PROCEDURE — 90834 PSYTX W PT 45 MINUTES: CPT | Mod: 95 | Performed by: SOCIAL WORKER

## 2020-09-10 ASSESSMENT — ANXIETY QUESTIONNAIRES
6. BECOMING EASILY ANNOYED OR IRRITABLE: NEARLY EVERY DAY
1. FEELING NERVOUS, ANXIOUS, OR ON EDGE: MORE THAN HALF THE DAYS
2. NOT BEING ABLE TO STOP OR CONTROL WORRYING: MORE THAN HALF THE DAYS
4. TROUBLE RELAXING: MORE THAN HALF THE DAYS
3. WORRYING TOO MUCH ABOUT DIFFERENT THINGS: MORE THAN HALF THE DAYS
GAD7 TOTAL SCORE: 13
7. FEELING AFRAID AS IF SOMETHING AWFUL MIGHT HAPPEN: NOT AT ALL
5. BEING SO RESTLESS THAT IT IS HARD TO SIT STILL: MORE THAN HALF THE DAYS

## 2020-09-10 ASSESSMENT — PATIENT HEALTH QUESTIONNAIRE - PHQ9: SUM OF ALL RESPONSES TO PHQ QUESTIONS 1-9: 4

## 2020-09-10 NOTE — PROGRESS NOTES
Progress Note    Patient Name: Dannie Hart  Date: 9.10.2020           Service Type: Individual  Video Visit: Yes, the patient's condition can be safely assessed and treated via synchronous audio and visual telemedicine encounter.      Reason for Video Visit: Services only offered telehealth    Location of Originating Site: Patient's home    Distant Site: Provider Remote Setting      Distant Site: Provider Remote Setting home office     Consent:  The patient/guardian has verbally consented to: the potential risks and benefits of telemedicine (video visit) versus in person care; bill my insurance or make self-payment for services provided; and responsibility for payment of non-covered services.         Mode of Communication:  Video Conference via [employer]     As the provider I attest to compliance with applicable laws and regulations related to telemedicine.   Session Start Time: 1108  Session End Time: 1149     Session Length: 42 minutes    Session #: 3    Attendees: Client     Treatment Plan Last Reviewed: 9.10.2020    PHQ-9 / EVELYN-7 : 4/13    DATA  Interactive Complexity: No  Crisis: No       Progress Since Last Session (Related to Symptoms / Goals / Homework):   Symptoms: Worsening restarted therapy    Homework: Partially completed      Episode of Care Goals: Minimal progress - CONTEMPLATION (Considering change and yet undecided); Intervened by assessing the negative and positive thinking (ambivalence) about behavior change     Current / Ongoing Stressors and Concerns:   COVID-19, family, side effects to anxiety     Treatment Objective(s) Addressed in This Session:   Patient will demonstrate and report a level of anxiety that can be managed at a lower level of care.  Absence of persistent anxious mood and report of reduced frequency and intensity of worry and absence of persistent anxious mood to acceptable levels, no panic attacks, report subjective comfort with  rumination for a period of 90 days, within 6 months as clinically observed and by patient self-report.       Intervention:   Therapist met with patient to review goals and interventions. Therapist utilized reflected listening as patient gave brief reflection of week. Patient reported  an increase in anxiety and processed. Therapist supported patient as he processed and validated patient. Therapist reviewed grounding skills and coping skills for patient to manage his anxiety and encouraged patient to call PCP about medication.    Patient presented with an anxious affect. Patient was engaged in session and open to feedback. Patient reported no safety concerns.       ASSESSMENT: Current Emotional / Mental Status (status of significant symptoms):   Risk status (Self / Other harm or suicidal ideation)   Patient denies current fears or concerns for personal safety.   Patient denies current or recent suicidal ideation or behaviors.   Patient denies current or recent homicidal ideation or behaviors.   Patient denies current or recent self injurious behavior or ideation.   Patient denies other safety concerns.    Patient reports there has been no change in risk factors since their last session.     Patient reports there has been no change in protective factors since their last session.     Recommended that patient call 911 or go to the local ED should there be a change in any of these risk factors.     Appearance:   Appropriate    Eye Contact:   Fair    Psychomotor Behavior: Restless    Attitude:   Cooperative  Friendly   Orientation:   All   Speech    Rate / Production: Emotional Talkative    Volume:  Normal    Mood:    Anxious    Affect:    Worrisome    Thought Content:  Clear    Thought Form:  Coherent    Insight:    Fair      Medication Review:   No current psychiatric medications prescribed     Medication Compliance:   NA     Changes in Health Issues:   None reported     Chemical Use Review:   Substance Use: Chemical  use reviewed, no active concerns identified      Tobacco Use: No current tobacco use.      Diagnosis:  1. Adjustment disorder with anxiety        Collateral Reports Completed:   Not Applicable    PLAN: (Patient Tasks / Therapist Tasks / Other)  Patient to call doctor about medications  Pt to name/manage his anxiety in the moment  Patient to ground self     Zelda Salazar, [facility] 9.10.2020                                                         ______________________________________________________________________    Treatment Plan    Patient's Name: Dannie Hart  YOB: 1975    Date: 9.10.2020      DSM5 Diagnoses: Adjustment Disorders  309.24 (F43.22) With anxiety  Psychosocial / Contextual Factors: Side effects to anxiety    WHODAS: 15    Referral / Collaboration:  Referral to another professional/service is not indicated at this time..    Anticipated number of session or this episode of care: 15      MeasurableTreatment Goal(s) related to diagnosis / functional impairment(s)  Goal 1: Patient will report absence of persistent anxiety mood and report of reduced frequency and intensity of worry and absence of persistent anxious mood to acceptable levels, no panic attacks, report subjective comfort with rumination or a period of 90 days. Within 6 months as clinically observed and by patient self-report    I will know I've met my goal when side effects go away.      Objective #A (Patient Action)    Patient will demonstrate and report a level of anxiety that can be managed at a lower level of care.  Absence of persistent anxious mood and report of reduced frequency and intensity of worry and absence of persistent anxious mood to acceptable levels, no panic attacks, report subjective comfort with rumination for a period of 90 days, within 6 months as clinically observed and by patient self-report.  Status: Restarted - Date: 9.10.2020     Intervention(s)  Therapist will provide individual therapy to  identify triggers to anxiety, gain feedback on helpful coping tools and thought-reframing techniques, and identify preferred way of being. Tx to include discuss current stressors and interpersonal conflicts and how to cope with these, coaching, diagnostic testing, referral for medication as indicated, use prescribed medication, cognitive restructuring, interpersonal, family therapy, supportive therapy services.                Patient has reviewed and agreed to the above plan.      Zelda Salazar, [facility]  9.10.2020

## 2020-09-11 ASSESSMENT — ANXIETY QUESTIONNAIRES: GAD7 TOTAL SCORE: 13

## 2020-09-15 ENCOUNTER — VIRTUAL VISIT (OUTPATIENT)
Dept: FAMILY MEDICINE | Facility: CLINIC | Age: 45
End: 2020-09-15
Payer: COMMERCIAL

## 2020-09-15 DIAGNOSIS — F41.9 ANXIETY: Primary | ICD-10-CM

## 2020-09-15 DIAGNOSIS — R07.81 RIB PAIN: ICD-10-CM

## 2020-09-15 PROCEDURE — 99214 OFFICE O/P EST MOD 30 MIN: CPT | Mod: 95 | Performed by: FAMILY MEDICINE

## 2020-09-15 PROCEDURE — 96127 BRIEF EMOTIONAL/BEHAV ASSMT: CPT | Performed by: FAMILY MEDICINE

## 2020-09-15 RX ORDER — HYDROXYZINE HYDROCHLORIDE 25 MG/1
25 TABLET, FILM COATED ORAL EVERY 8 HOURS PRN
Qty: 20 TABLET | Refills: 0 | Status: SHIPPED | OUTPATIENT
Start: 2020-09-15 | End: 2022-08-13

## 2020-09-15 ASSESSMENT — ANXIETY QUESTIONNAIRES
IF YOU CHECKED OFF ANY PROBLEMS ON THIS QUESTIONNAIRE, HOW DIFFICULT HAVE THESE PROBLEMS MADE IT FOR YOU TO DO YOUR WORK, TAKE CARE OF THINGS AT HOME, OR GET ALONG WITH OTHER PEOPLE: VERY DIFFICULT
6. BECOMING EASILY ANNOYED OR IRRITABLE: MORE THAN HALF THE DAYS
2. NOT BEING ABLE TO STOP OR CONTROL WORRYING: MORE THAN HALF THE DAYS
5. BEING SO RESTLESS THAT IT IS HARD TO SIT STILL: MORE THAN HALF THE DAYS
1. FEELING NERVOUS, ANXIOUS, OR ON EDGE: MORE THAN HALF THE DAYS
3. WORRYING TOO MUCH ABOUT DIFFERENT THINGS: MORE THAN HALF THE DAYS
7. FEELING AFRAID AS IF SOMETHING AWFUL MIGHT HAPPEN: NOT AT ALL
GAD7 TOTAL SCORE: 12

## 2020-09-15 ASSESSMENT — PATIENT HEALTH QUESTIONNAIRE - PHQ9
5. POOR APPETITE OR OVEREATING: MORE THAN HALF THE DAYS
SUM OF ALL RESPONSES TO PHQ QUESTIONS 1-9: 6

## 2020-09-15 NOTE — PROGRESS NOTES
"Dannie Hart is a 44 year old male who is being evaluated via a billable telephone visit.      The patient has been notified of following:     \"This telephone visit will be conducted via a call between you and your physician/provider. We have found that certain health care needs can be provided without the need for a physical exam.  This service lets us provide the care you need with a short phone conversation.  If a prescription is necessary we can send it directly to your pharmacy.  If lab work is needed we can place an order for that and you can then stop by our lab to have the test done at a later time.    Telephone visits are billed at different rates depending on your insurance coverage. During this emergency period, for some insurers they may be billed the same as an in-person visit.  Please reach out to your insurance provider with any questions.    If during the course of the call the physician/provider feels a telephone visit is not appropriate, you will not be charged for this service.\"    Patient has given verbal consent for Telephone visit?  Yes    What phone number would you like to be contacted at? [phone]    How would you like to obtain your AVS? Mail a copy    Subjective     Dannie Hart is a 44 year old male who presents via phone visit today for the following health issues:    HPI    Depression and Anxiety Follow-Up    How are you doing with your depression since your last visit? No change    How are you doing with your anxiety since your last visit?  No change    Are you having other symptoms that might be associated with depression or anxiety? Yes:  anxiety    Have you had a significant life event? No     Do you have any concerns with your use of alcohol or other drugs? No    Social History     Tobacco Use     Smoking status: Never Smoker     Smokeless tobacco: Never Used   Substance Use Topics     Alcohol use: Yes     Comment: socially     Drug use: No     PHQ 3/30/2020 9/10/2020 " 9/15/2020   PHQ-9 Total Score 10 4 6   Q9: Thoughts of better off dead/self-harm past 2 weeks Not at all Not at all Not at all     EVELYN-7 SCORE 3/30/2020 9/10/2020 9/15/2020   Total Score 12 13 12     Last PHQ-9 9/15/2020   1.  Little interest or pleasure in doing things 0   2.  Feeling down, depressed, or hopeless 1   3.  Trouble falling or staying asleep, or sleeping too much 1   4.  Feeling tired or having little energy 1   5.  Poor appetite or overeating 1   6.  Feeling bad about yourself 0   7.  Trouble concentrating 2   8.  Moving slowly or restless 0   Q9: Thoughts of better off dead/self-harm past 2 weeks 0   PHQ-9 Total Score 6   Difficulty at work, home, or with people Somewhat difficult     EVELYN-7  9/15/2020   1. Feeling nervous, anxious, or on edge 2   2. Not being able to stop or control worrying 2   3. Worrying too much about different things 2   4. Trouble relaxing 2   5. Being so restless that it is hard to sit still 2   6. Becoming easily annoyed or irritable 2   7. Feeling afraid, as if something awful might happen 0   EVELYN-7 Total Score 12   If you checked any problems, how difficult have they made it for you to do your work, take care of things at home, or get along with other people? Very difficult       Suicide Assessment Five-step Evaluation and Treatment (SAFE-T)      How many servings of fruits and vegetables do you eat daily?  0-1    On average, how many sweetened beverages do you drink each day (Examples: soda, juice, sweet tea, etc.  Do NOT count diet or artificially sweetened beverages)?   1    How many days per week do you exercise enough to make your heart beat faster? 3 or less    How many minutes a day do you exercise enough to make your heart beat faster? 60 or more    How many days per week do you miss taking your medication? 0         Has been off zoloft since June and his therapist recommended having a visit and seeing if he could get a medication that he could use when his anxiety  is higher with school    Review of Systems   Teaching full time    Hybrid    Melatonin as needed    Patient started counseling/ therapy    Weaned off sertraline in June    This is time of year when things ramp up    Sertraline was hard to get off of    Sertraline got to point where was not helping much    On for 1 1/2 years      Was exercising but broke rib last weekend , fell off ladder    Rib up and down    Sees therapist every couple weeks           Objective          Vitals:  No vitals were obtained today due to virtual visit.    healthy, alert and no distress  PSYCH: Alert and oriented times 3; coherent speech, normal   rate and volume, able to articulate logical thoughts, able   to abstract reason, no tangential thoughts, no hallucinations   or delusions  His affect is normal  RESP: No cough, no audible wheezing, able to talk in full sentences  Remainder of exam unable to be completed due to telephone visits             Assessment/Plan:     ASSESSMENT / PLAN:  (F41.9) Anxiety  (primary encounter diagnosis)  Comment: discussed in detail with patient.  We agreed just having an as needed med available would be helpful . Avoid benzo class.  Use prn hydroxyzine.  Warned of sedation.  Plan: hydrOXYzine (ATARAX) 25 MG tablet             (R07.81) Rib pain  Comment: improving,  Now on over the counter meds   Plan: follow up prn       I reviewed the patient's medications, allergies, medical history, family history, and social history.    Adeel Ramirez MD        Phone call duration:  12 minutes 9:06 to 9:18 am     Adeel Ramirez MD

## 2020-09-16 ASSESSMENT — ANXIETY QUESTIONNAIRES: GAD7 TOTAL SCORE: 12

## 2020-10-05 ENCOUNTER — VIRTUAL VISIT (OUTPATIENT)
Dept: PSYCHOLOGY | Facility: CLINIC | Age: 45
End: 2020-10-05
Payer: COMMERCIAL

## 2020-10-05 DIAGNOSIS — F43.22 ADJUSTMENT DISORDER WITH ANXIETY: Primary | ICD-10-CM

## 2020-10-05 PROCEDURE — 90834 PSYTX W PT 45 MINUTES: CPT | Mod: 95 | Performed by: SOCIAL WORKER

## 2020-10-05 ASSESSMENT — PATIENT HEALTH QUESTIONNAIRE - PHQ9: SUM OF ALL RESPONSES TO PHQ QUESTIONS 1-9: 6

## 2020-10-05 ASSESSMENT — ANXIETY QUESTIONNAIRES
7. FEELING AFRAID AS IF SOMETHING AWFUL MIGHT HAPPEN: NOT AT ALL
4. TROUBLE RELAXING: MORE THAN HALF THE DAYS
3. WORRYING TOO MUCH ABOUT DIFFERENT THINGS: MORE THAN HALF THE DAYS
1. FEELING NERVOUS, ANXIOUS, OR ON EDGE: MORE THAN HALF THE DAYS
GAD7 TOTAL SCORE: 11
5. BEING SO RESTLESS THAT IT IS HARD TO SIT STILL: MORE THAN HALF THE DAYS
2. NOT BEING ABLE TO STOP OR CONTROL WORRYING: MORE THAN HALF THE DAYS
6. BECOMING EASILY ANNOYED OR IRRITABLE: SEVERAL DAYS

## 2020-10-05 NOTE — PROGRESS NOTES
Progress Note    Patient Name: Dannie Hart  Date: 10/5/2020           Service Type: Individual  Video Visit: Yes, the patient's condition can be safely assessed and treated via synchronous audio and visual telemedicine encounter.      Reason for Video Visit: Services only offered telehealth    Location of Originating Site: Patient's home    Distant Site: Provider Remote Setting      Distant Site: Provider Remote Setting home office     Consent:  The patient/guardian has verbally consented to: the potential risks and benefits of telemedicine (video visit) versus in person care; bill my insurance or make self-payment for services provided; and responsibility for payment of non-covered services.         Mode of Communication:  Video Conference via [employer]     As the provider I attest to compliance with applicable laws and regulations related to telemedicine.       Session Start Time: 1536  Session End Time: 1626     Session Length: 50 minutes    Session #: 4    Attendees: Client     Treatment Plan Last Reviewed: 9.10.2020 due 12/10/2020    PHQ-9 / EVELYN-7 : 6/11    DATA  Interactive Complexity: No  Crisis: No       Progress Since Last Session (Related to Symptoms / Goals / Homework):   Symptoms: Improving decrease PHQ-9GAD-7    Homework: Partially completed      Episode of Care Goals: Minimal progress - CONTEMPLATION (Considering change and yet undecided); Intervened by assessing the negative and positive thinking (ambivalence) about behavior change     Current / Ongoing Stressors and Concerns:   COVID-19, family, side effects to anxiety     Treatment Objective(s) Addressed in This Session:   Patient will demonstrate and report a level of anxiety that can be managed at a lower level of care.  Absence of persistent anxious mood and report of reduced frequency and intensity of worry and absence of persistent anxious mood to acceptable levels, no panic attacks, report  subjective comfort with rumination for a period of 90 days, within 6 months as clinically observed and by patient self-report.       Intervention:   Therapist met with patient to review goals and interventions. Therapist utilized reflected listening as patient gave brief reflection of week. Patient reported  having two good days after a very difficult week. Therapist supported patient as he processed. Therapist encouraged patient to identfy trigger. Patient was able to identify triggers and therapist coached patient through managing and naming emotions in the moment.   Patient presented with an anxious/brighter affect. Patient was engaged in session and open to feedback. Patient reported no safety concerns.       ASSESSMENT: Current Emotional / Mental Status (status of significant symptoms):   Risk status (Self / Other harm or suicidal ideation)   Patient denies current fears or concerns for personal safety.   Patient denies current or recent suicidal ideation or behaviors.   Patient denies current or recent homicidal ideation or behaviors.   Patient denies current or recent self injurious behavior or ideation.   Patient denies other safety concerns.    Patient reports there has been no change in risk factors since their last session.     Patient reports there has been no change in protective factors since their last session.     Recommended that patient call 911 or go to the local ED should there be a change in any of these risk factors.     Appearance:   Appropriate    Eye Contact:   Fair    Psychomotor Behavior: Restless    Attitude:   Cooperative  Friendly   Orientation:   All   Speech    Rate / Production: Emotional Talkative    Volume:  Normal    Mood:    Anxious    Affect:    Bright  Worrisome    Thought Content:  Clear    Thought Form:  Coherent    Insight:    Fair      Medication Review:   Changes to psychiatric medications, see updated Medication List in EPIC.      Medication Compliance:   Yes     Changes  "in Health Issues:   None reported     Chemical Use Review:   Substance Use: Chemical use reviewed, no active concerns identified      Tobacco Use: No current tobacco use.      Diagnosis:  1. Adjustment disorder with anxiety        Collateral Reports Completed:   Not Applicable    PLAN: (Patient Tasks / Therapist Tasks / Other)    Pt to name/manage his anxiety in the moment  Patient to ground self-continue  Patient to plan and follow \"then what\"    Zelda Salazar, [facility] 10/5/2020                                                         ______________________________________________________________________    Treatment Plan    Patient's Name: Dannie Hart  YOB: 1975    Date: 9.10.2020      DSM5 Diagnoses: Adjustment Disorders  309.24 (F43.22) With anxiety  Psychosocial / Contextual Factors: Side effects to anxiety    WHODAS: 15    Referral / Collaboration:  Referral to another professional/service is not indicated at this time..    Anticipated number of session or this episode of care: 15      MeasurableTreatment Goal(s) related to diagnosis / functional impairment(s)  Goal 1: Patient will report absence of persistent anxiety mood and report of reduced frequency and intensity of worry and absence of persistent anxious mood to acceptable levels, no panic attacks, report subjective comfort with rumination or a period of 90 days. Within 6 months as clinically observed and by patient self-report    I will know I've met my goal when side effects go away.      Objective #A (Patient Action)    Patient will demonstrate and report a level of anxiety that can be managed at a lower level of care.  Absence of persistent anxious mood and report of reduced frequency and intensity of worry and absence of persistent anxious mood to acceptable levels, no panic attacks, report subjective comfort with rumination for a period of 90 days, within 6 months as clinically observed and by patient self-report.  Status: " Restarted - Date: 9.10.2020     Intervention(s)  Therapist will provide individual therapy to identify triggers to anxiety, gain feedback on helpful coping tools and thought-reframing techniques, and identify preferred way of being. Tx to include discuss current stressors and interpersonal conflicts and how to cope with these, coaching, diagnostic testing, referral for medication as indicated, use prescribed medication, cognitive restructuring, interpersonal, family therapy, supportive therapy services.                Patient has reviewed and agreed to the above plan.      Zelda Salazar, [facility]  9.10.2020

## 2020-10-06 ASSESSMENT — ANXIETY QUESTIONNAIRES: GAD7 TOTAL SCORE: 11

## 2021-04-09 ENCOUNTER — OFFICE VISIT (OUTPATIENT)
Dept: FAMILY MEDICINE | Facility: CLINIC | Age: 46
End: 2021-04-09
Payer: COMMERCIAL

## 2021-04-09 VITALS
TEMPERATURE: 97.8 F | HEART RATE: 78 BPM | BODY MASS INDEX: 29.44 KG/M2 | DIASTOLIC BLOOD PRESSURE: 84 MMHG | WEIGHT: 210.25 LBS | SYSTOLIC BLOOD PRESSURE: 130 MMHG | HEIGHT: 71 IN

## 2021-04-09 DIAGNOSIS — G47.00 INSOMNIA, UNSPECIFIED TYPE: ICD-10-CM

## 2021-04-09 DIAGNOSIS — Z01.818 PREOP GENERAL PHYSICAL EXAM: Primary | ICD-10-CM

## 2021-04-09 DIAGNOSIS — M25.521 RIGHT ELBOW PAIN: ICD-10-CM

## 2021-04-09 LAB
BASOPHILS # BLD AUTO: 0 10E9/L (ref 0–0.2)
BASOPHILS NFR BLD AUTO: 0.5 %
CREAT SERPL-MCNC: 1.12 MG/DL (ref 0.66–1.25)
DIFFERENTIAL METHOD BLD: NORMAL
EOSINOPHIL # BLD AUTO: 0.3 10E9/L (ref 0–0.7)
EOSINOPHIL NFR BLD AUTO: 4.4 %
ERYTHROCYTE [DISTWIDTH] IN BLOOD BY AUTOMATED COUNT: 13.3 % (ref 10–15)
GFR SERPL CREATININE-BSD FRML MDRD: 80 ML/MIN/{1.73_M2}
HCT VFR BLD AUTO: 47.1 % (ref 40–53)
HGB BLD-MCNC: 16 G/DL (ref 13.3–17.7)
LYMPHOCYTES # BLD AUTO: 2.4 10E9/L (ref 0.8–5.3)
LYMPHOCYTES NFR BLD AUTO: 38.6 %
MCH RBC QN AUTO: 29.5 PG (ref 26.5–33)
MCHC RBC AUTO-ENTMCNC: 34 G/DL (ref 31.5–36.5)
MCV RBC AUTO: 87 FL (ref 78–100)
MONOCYTES # BLD AUTO: 0.4 10E9/L (ref 0–1.3)
MONOCYTES NFR BLD AUTO: 7.1 %
NEUTROPHILS # BLD AUTO: 3.1 10E9/L (ref 1.6–8.3)
NEUTROPHILS NFR BLD AUTO: 49.4 %
PLATELET # BLD AUTO: 251 10E9/L (ref 150–450)
POTASSIUM SERPL-SCNC: 3.9 MMOL/L (ref 3.4–5.3)
RBC # BLD AUTO: 5.43 10E12/L (ref 4.4–5.9)
WBC # BLD AUTO: 6.2 10E9/L (ref 4–11)

## 2021-04-09 PROCEDURE — 84132 ASSAY OF SERUM POTASSIUM: CPT | Performed by: FAMILY MEDICINE

## 2021-04-09 PROCEDURE — 36415 COLL VENOUS BLD VENIPUNCTURE: CPT | Performed by: FAMILY MEDICINE

## 2021-04-09 PROCEDURE — 99214 OFFICE O/P EST MOD 30 MIN: CPT | Performed by: FAMILY MEDICINE

## 2021-04-09 PROCEDURE — 82565 ASSAY OF CREATININE: CPT | Performed by: FAMILY MEDICINE

## 2021-04-09 PROCEDURE — 85025 COMPLETE CBC W/AUTO DIFF WBC: CPT | Performed by: FAMILY MEDICINE

## 2021-04-09 ASSESSMENT — MIFFLIN-ST. JEOR: SCORE: 1874.94

## 2021-04-09 NOTE — PATIENT INSTRUCTIONS

## 2021-04-09 NOTE — PROGRESS NOTES
[facility]  [address] [place] [address]  [place] [address]  Phone: [phone]  Primary Provider: Natanael Timmons  Pre-op Performing Provider: NATANAEL TIMMONS       PREOPERATIVE EVALUATION:  Today's date: 4/9/2021    Dannie Hart is a 45 year old male who presents for a preoperative evaluation.    Surgical Information:  Surgery/Procedure: Tenex right elbow  Surgery Location:[facility] center  Surgeon: Brittany  Surgery Date: 04/13/2021  Time of Surgery:   Where patient plans to recover: At home with family  Fax number for surgical facility:     Type of Anesthesia Anticipated: General         Subjective     HPI related to upcoming procedure: 45 year old male with right elbow with torn extensor tendon.  To have surgical repair next week at [facility].     Preop Questions 4/9/2021   1. Have you ever had a heart attack or stroke? No   2. Have you ever had surgery on your heart or blood vessels, such as a stent placement, a coronary artery bypass, or surgery on an artery in your head, neck, heart, or legs? No   3. Do you have chest pain with activity? No   4. Do you have a history of  heart failure? No   5. Do you currently have a cold, bronchitis or symptoms of other infection? No   6. Do you have a cough, shortness of breath, or wheezing? No   7. Do you or anyone in your family have previous history of blood clots? YES - mom   8. Do you or does anyone in your family have a serious bleeding problem such as prolonged bleeding following surgeries or cuts? No   9. Have you ever had problems with anemia or been told to take iron pills? No   10. Have you had any abnormal blood loss such as black, tarry or bloody stools? No   11. Have you ever had a blood transfusion? No   12. Are you willing to have a blood transfusion if it is medically needed before, during, or after your surgery? Yes   13. Have you or any of your relatives ever had problems with anesthesia? No   14. Do you  have sleep apnea, excessive snoring or daytime drowsiness? YES - snores, but no sleep apnea; light sleeper   14a. Do you have a CPAP machine? No   15. Do you have any artifical heart valves or other implanted medical devices like a pacemaker, defibrillator, or continuous glucose monitor? No   16. Do you have artificial joints? No   17. Are you allergic to latex? No       Health Care Directive:  Patient does not have a Health Care Directive or Living Will:     Preoperative Review of :  Not on any controlled substances           Review of Systems  Constitutional, neuro, ENT, endocrine, pulmonary, cardiac, gastrointestinal, genitourinary, musculoskeletal, integument and psychiatric systems are negative, except as otherwise noted.    Patient Active Problem List    Diagnosis Date Noted     High triglycerides 06/25/2019     Priority: Medium     Anxiety 10/23/2018     Priority: Medium     Moderate major depression (H) 10/23/2018     Priority: Medium     CARDIOVASCULAR SCREENING; LDL GOAL LESS THAN 100 09/11/2018     Priority: Medium     S/P hernia repair 12/17/2010     Priority: Medium     Rotator cuff tear 09/24/2009     Priority: Medium      History reviewed. No pertinent past medical history.  Past Surgical History:   Procedure Laterality Date     APPENDECTOMY  2011     HERNIA REPAIR  2010    left inguinal     RELEASE CARPAL TUNNEL  03/2018    right wrist      VASECTOMY  2016     Current Outpatient Medications   Medication Sig Dispense Refill     hydrOXYzine (ATARAX) 25 MG tablet Take 1 tablet (25 mg) by mouth every 8 hours as needed for anxiety 20 tablet 0     ibuprofen (ADVIL,MOTRIN) 800 MG tablet Take 800 mg by mouth every 8 hours as needed. As needed           Allergies   Allergen Reactions     Hydrocodone-Acetaminophen Anxiety and Nausea        Social History     Tobacco Use     Smoking status: Never Smoker     Smokeless tobacco: Never Used   Substance Use Topics     Alcohol use: Yes     Comment: socially       "  History   Drug Use No         Objective     /84 (BP Location: Left arm, Patient Position: Chair, Cuff Size: Adult Regular)   Pulse 78   Temp 97.8  F (36.6  C) (Oral)   Ht 1.81 m (5' 11.26\")   Wt 95.4 kg (210 lb 4 oz)   BMI 29.11 kg/m      Physical Exam    GENERAL APPEARANCE: healthy, alert and no distress     EYES: EOMI,  PERRL     HENT: ear canals and TM's normal and nose and mouth without ulcers or lesions     NECK: no adenopathy, no asymmetry, masses, or scars and thyroid normal to palpation     RESP: lungs clear to auscultation - no rales, rhonchi or wheezes     CV: regular rates and rhythm, normal S1 S2, no S3 or S4 and no murmur, click or rub     ABDOMEN:  soft, nontender, no HSM or masses and bowel sounds normal     MS: extremities normal- no gross deformities noted, no evidence of inflammation in joints, FROM in all extremities.     SKIN: no suspicious lesions or rashes     NEURO: Normal strength and tone, sensory exam grossly normal, mentation intact and speech normal     PSYCH: mentation appears normal. and affect normal/bright     LYMPHATICS: No cervical adenopathy    Recent Labs   Lab Test 09/04/20 11/12/19  0912   HGB  --  16.0   PLT  --  254   NA  --  141   POTASSIUM 4.2 4.2   CR 0.91 0.95        Diagnostics:  Labs drawn, pending        Revised Cardiac Risk Index (RCRI):  The patient has the following serious cardiovascular risks for perioperative complications:   - No serious cardiac risks = 0 points     RCRI Interpretation: 0 points: Class I (very low risk - 0.4% complication rate)    ASSESSMENT / PLAN:  (Z01.818) Preop general physical exam  (primary encounter diagnosis)  Comment: patient okay for procedure if labs okay.  Good overall health.  No history of anesthesia problems.    Plan: Potassium, Creatinine, CBC with platelets         differential             (M25.521) Right elbow pain  Comment: as above   Plan: as above     (G47.00) Insomnia, unspecified type  Comment: of note, many " months of bad sleep.  Did referral for sleep specialist.   Plan: SLEEP EVALUATION & MANAGEMENT REFERRAL - [facility]         -[facility] [place]          [phone] (Age 15 and up)               I reviewed the patient's medications, allergies, medical history, family history, and social history.    Adeel Ramirez MD           Signed Electronically by: Adeel Ramirez MD  Copy of this evaluation report is provided to requesting physician.

## 2021-04-17 ENCOUNTER — HEALTH MAINTENANCE LETTER (OUTPATIENT)
Age: 46
End: 2021-04-17

## 2021-05-18 ENCOUNTER — TELEPHONE (OUTPATIENT)
Dept: FAMILY MEDICINE | Facility: CLINIC | Age: 46
End: 2021-05-18

## 2021-05-18 NOTE — TELEPHONE ENCOUNTER
Patient Quality Outreach      Summary:    Patient has the following on his problem list/HM:   Immunizations     No immunizations due        Patient is due/failing the following:   Immunizations    Type of outreach:    patient had flu shot. I updated in chart    Questions for provider review:    None                                                                                                                                     [facility]       Chart routed to chart closed.

## 2021-06-04 ENCOUNTER — OFFICE VISIT (OUTPATIENT)
Dept: FAMILY MEDICINE | Facility: CLINIC | Age: 46
End: 2021-06-04
Payer: COMMERCIAL

## 2021-06-04 VITALS
SYSTOLIC BLOOD PRESSURE: 116 MMHG | TEMPERATURE: 97.4 F | HEIGHT: 71 IN | WEIGHT: 204 LBS | HEART RATE: 84 BPM | DIASTOLIC BLOOD PRESSURE: 75 MMHG | BODY MASS INDEX: 28.56 KG/M2

## 2021-06-04 DIAGNOSIS — R73.01 IMPAIRED FASTING GLUCOSE: ICD-10-CM

## 2021-06-04 DIAGNOSIS — Z00.00 ROUTINE GENERAL MEDICAL EXAMINATION AT A HEALTH CARE FACILITY: Primary | ICD-10-CM

## 2021-06-04 DIAGNOSIS — E78.5 HYPERLIPIDEMIA LDL GOAL <100: ICD-10-CM

## 2021-06-04 DIAGNOSIS — Z12.5 SCREENING FOR PROSTATE CANCER: ICD-10-CM

## 2021-06-04 DIAGNOSIS — H81.11 BENIGN PAROXYSMAL POSITIONAL VERTIGO OF RIGHT EAR: ICD-10-CM

## 2021-06-04 LAB
ALBUMIN SERPL-MCNC: 4.1 G/DL (ref 3.4–5)
ALP SERPL-CCNC: 83 U/L (ref 40–150)
ALT SERPL W P-5'-P-CCNC: 51 U/L (ref 0–70)
ANION GAP SERPL CALCULATED.3IONS-SCNC: 4 MMOL/L (ref 3–14)
AST SERPL W P-5'-P-CCNC: 18 U/L (ref 0–45)
BILIRUB SERPL-MCNC: 0.8 MG/DL (ref 0.2–1.3)
BUN SERPL-MCNC: 18 MG/DL (ref 7–30)
CALCIUM SERPL-MCNC: 8.6 MG/DL (ref 8.5–10.1)
CHLORIDE SERPL-SCNC: 110 MMOL/L (ref 94–109)
CHOLEST SERPL-MCNC: 175 MG/DL
CO2 SERPL-SCNC: 28 MMOL/L (ref 20–32)
CREAT SERPL-MCNC: 0.89 MG/DL (ref 0.66–1.25)
GFR SERPL CREATININE-BSD FRML MDRD: >90 ML/MIN/{1.73_M2}
GLUCOSE SERPL-MCNC: 98 MG/DL (ref 70–99)
HBA1C MFR BLD: 5.5 % (ref 0–5.6)
HDLC SERPL-MCNC: 40 MG/DL
LDLC SERPL CALC-MCNC: 105 MG/DL
NONHDLC SERPL-MCNC: 135 MG/DL
POTASSIUM SERPL-SCNC: 4.1 MMOL/L (ref 3.4–5.3)
PROT SERPL-MCNC: 7.5 G/DL (ref 6.8–8.8)
PSA SERPL-ACNC: 3.89 UG/L (ref 0–4)
SODIUM SERPL-SCNC: 142 MMOL/L (ref 133–144)
TRIGL SERPL-MCNC: 148 MG/DL

## 2021-06-04 PROCEDURE — 83036 HEMOGLOBIN GLYCOSYLATED A1C: CPT | Performed by: FAMILY MEDICINE

## 2021-06-04 PROCEDURE — 80053 COMPREHEN METABOLIC PANEL: CPT | Performed by: FAMILY MEDICINE

## 2021-06-04 PROCEDURE — 80061 LIPID PANEL: CPT | Performed by: FAMILY MEDICINE

## 2021-06-04 PROCEDURE — 36415 COLL VENOUS BLD VENIPUNCTURE: CPT | Performed by: FAMILY MEDICINE

## 2021-06-04 PROCEDURE — 99396 PREV VISIT EST AGE 40-64: CPT | Performed by: FAMILY MEDICINE

## 2021-06-04 PROCEDURE — G0103 PSA SCREENING: HCPCS | Performed by: FAMILY MEDICINE

## 2021-06-04 ASSESSMENT — ENCOUNTER SYMPTOMS
NERVOUS/ANXIOUS: 0
PARESTHESIAS: 0
PALPITATIONS: 0
DIARRHEA: 0
JOINT SWELLING: 0
FREQUENCY: 0
CONSTIPATION: 0
HEADACHES: 0
HEMATURIA: 0
FEVER: 0
SORE THROAT: 0
ABDOMINAL PAIN: 0
HEARTBURN: 0
DIZZINESS: 0
EYE PAIN: 0
CHILLS: 0
DYSURIA: 0
SHORTNESS OF BREATH: 0
NAUSEA: 0
MYALGIAS: 0
HEMATOCHEZIA: 0
WEAKNESS: 0
COUGH: 0
ARTHRALGIAS: 1

## 2021-06-04 ASSESSMENT — ANXIETY QUESTIONNAIRES
6. BECOMING EASILY ANNOYED OR IRRITABLE: SEVERAL DAYS
5. BEING SO RESTLESS THAT IT IS HARD TO SIT STILL: NOT AT ALL
GAD7 TOTAL SCORE: 5
3. WORRYING TOO MUCH ABOUT DIFFERENT THINGS: SEVERAL DAYS
7. FEELING AFRAID AS IF SOMETHING AWFUL MIGHT HAPPEN: SEVERAL DAYS
1. FEELING NERVOUS, ANXIOUS, OR ON EDGE: SEVERAL DAYS
2. NOT BEING ABLE TO STOP OR CONTROL WORRYING: NOT AT ALL
IF YOU CHECKED OFF ANY PROBLEMS ON THIS QUESTIONNAIRE, HOW DIFFICULT HAVE THESE PROBLEMS MADE IT FOR YOU TO DO YOUR WORK, TAKE CARE OF THINGS AT HOME, OR GET ALONG WITH OTHER PEOPLE: SOMEWHAT DIFFICULT

## 2021-06-04 ASSESSMENT — PATIENT HEALTH QUESTIONNAIRE - PHQ9
SUM OF ALL RESPONSES TO PHQ QUESTIONS 1-9: 1
5. POOR APPETITE OR OVEREATING: SEVERAL DAYS

## 2021-06-04 ASSESSMENT — MIFFLIN-ST. JEOR: SCORE: 1846.59

## 2021-06-04 NOTE — PROGRESS NOTES
SUBJECTIVE:   CC: Dannie Hart is an 45 year old male who presents for preventative health visit.        Patient has been advised of split billing requirements and indicates understanding: Yes  Healthy Habits:     Getting at least 3 servings of Calcium per day:  Yes    Bi-annual eye exam:  NO    Dental care twice a year:  Yes    Sleep apnea or symptoms of sleep apnea:  None    Diet:  Regular (no restrictions)    Frequency of exercise:  2-3 days/week    Duration of exercise:  15-30 minutes    Taking medications regularly:  Yes    Medication side effects:  Not applicable    PHQ-2 Total Score: 0    Additional concerns today:  Yes               Today's PHQ-2 Score:   PHQ-2 ( 1999 [employer]) 6/4/2021   Q1: Little interest or pleasure in doing things 0   Q2: Feeling down, depressed or hopeless 0   PHQ-2 Score 0   Q1: Little interest or pleasure in doing things Not at all   Q2: Feeling down, depressed or hopeless Not at all   PHQ-2 Score 0       Abuse: Current or Past(Physical, Sexual or Emotional)- No  Do you feel safe in your environment? Yes        Social History     Tobacco Use     Smoking status: Never Smoker     Smokeless tobacco: Never Used   Substance Use Topics     Alcohol use: Yes     Comment: socially     If you drink alcohol do you typically have >3 drinks per day or >7 drinks per week? Yes      Alcohol Use 6/4/2021   Prescreen: >3 drinks/day or >7 drinks/week? No   Prescreen: >3 drinks/day or >7 drinks/week? -   No flowsheet data found.    Last PSA:   PSA   Date Value Ref Range Status   11/12/2019 3.30 0 - 4 ug/L Final     Comment:     Assay Method:  Chemiluminescence using Siemens Vista analyzer       Reviewed orders with patient. Reviewed health maintenance and updated orders accordingly - Yes       Reviewed and updated as needed this visit by clinical staff  Tobacco  Allergies  Meds              Reviewed and updated as needed this visit by Provider                    Review of Systems   Constitutional:  "Negative for chills and fever.   HENT: Negative for congestion, ear pain, hearing loss and sore throat.    Eyes: Negative for pain and visual disturbance.   Respiratory: Negative for cough and shortness of breath.    Cardiovascular: Negative for chest pain, palpitations and peripheral edema.   Gastrointestinal: Negative for abdominal pain, constipation, diarrhea, heartburn, hematochezia and nausea.   Genitourinary: Negative for discharge, dysuria, frequency, genital sores, hematuria and urgency.   Musculoskeletal: Positive for arthralgias. Negative for joint swelling and myalgias.   Skin: Negative for rash.   Neurological: Negative for dizziness, weakness, headaches and paresthesias.   Psychiatric/Behavioral: Negative for mood changes. The patient is not nervous/anxious.       had the elbow surgery    Did rehab etc    Anxiety was good  Had a spell when mom's cancer came back, doing okay    Kids in good health    Done with school year    Knee and shoulder acting up some    Stair machine, elliptical, light weigts    Yoga    Vaccinated    Happy with wt   Would like under 200    Just occasional hydroxyzine      OBJECTIVE:   /75 (BP Location: Right arm, Patient Position: Chair, Cuff Size: Adult Regular)   Pulse 84   Temp 97.4  F (36.3  C) (Temporal)   Ht 1.81 m (5' 11.26\")   Wt 92.5 kg (204 lb)   BMI 28.24 kg/m      Physical Exam  GENERAL: healthy, alert and no distress  EYES: Eyes grossly normal to inspection, PERRL and conjunctivae and sclerae normal  HENT: ear canals and TM's normal, nose and mouth without ulcers or lesions  NECK: no adenopathy, no asymmetry, masses, or scars and thyroid normal to palpation  RESP: lungs clear to auscultation - no rales, rhonchi or wheezes  CV: regular rate and rhythm, normal S1 S2, no S3 or S4, no murmur, click or rub, no peripheral edema and peripheral pulses strong  ABDOMEN: soft, nontender, no hepatosplenomegaly, no masses and bowel sounds normal  MS: no gross " "musculoskeletal defects noted, no edema  SKIN: no suspicious lesions or rashes  NEURO: Normal strength and tone, mentation intact and speech normal  PSYCH: mentation appears normal, affect normal/bright    Diagnostic Test Results:  Labs reviewed in Epic    ASSESSMENT/PLAN:    Dannie was seen today for physical and health maintenance.    Diagnoses and all orders for this visit:    Routine general medical examination at a health care facility    Screening for prostate cancer  -     Prostate spec antigen screen    Hyperlipidemia LDL goal <100  -     Lipid panel reflex to direct LDL Fasting  -     Comprehensive metabolic panel    Impaired fasting glucose  -     Hemoglobin A1c    Benign paroxysmal positional vertigo of right ear  -     PHYSICAL THERAPY REFERRAL; Future    Other orders  -     REVIEW OF HEALTH MAINTENANCE PROTOCOL ORDERS    overall patient in good health  Keep working on healthy diet/exercise   See phys therapy for the vertigo episodes, bothering for about a month  Anxiety doing well  Check labs     Patient has been advised of split billing requirements and indicates understanding: Yes  COUNSELING:   Reviewed preventive health counseling, as reflected in patient instructions       Regular exercise       Healthy diet/nutrition       Vision screening       Family planning       Safe sex practices/STD prevention       Prostate cancer screening    Estimated body mass index is 28.24 kg/m  as calculated from the following:    Height as of this encounter: 1.81 m (5' 11.26\").    Weight as of this encounter: 92.5 kg (204 lb).     Weight management plan: Discussed healthy diet and exercise guidelines    He reports that he has never smoked. He has never used smokeless tobacco.      Counseling Resources:  ATP IV Guidelines  Pooled Cohorts Equation Calculator  FRAX Risk Assessment  ICSI Preventive Guidelines  Dietary Guidelines for Americans, 2010  USDA's MyPlate  ASA Prophylaxis  Lung CA Screening    Adeel Ramirez, " MD [facility]

## 2021-06-04 NOTE — PATIENT INSTRUCTIONS
Keep working on healthy diet/exercise     We will send you lab results    See physical therapy for vertigo

## 2021-06-05 ASSESSMENT — ANXIETY QUESTIONNAIRES: GAD7 TOTAL SCORE: 5

## 2021-06-05 NOTE — RESULT ENCOUNTER NOTE
"LDL \"bad\" cholesterol is only slightly above ideal range.  Not worrisome.    Keep working on healthy diet/exercise.    Other labs are fine.    Normal hemoglobin a1c means no diabetes.    Adeel Ramirez MD  "

## 2021-06-07 ENCOUNTER — HOSPITAL ENCOUNTER (OUTPATIENT)
Dept: PHYSICAL THERAPY | Facility: CLINIC | Age: 46
Setting detail: THERAPIES SERIES
End: 2021-06-07
Attending: FAMILY MEDICINE
Payer: COMMERCIAL

## 2021-06-07 DIAGNOSIS — H81.11 BENIGN PAROXYSMAL POSITIONAL VERTIGO OF RIGHT EAR: ICD-10-CM

## 2021-06-07 PROCEDURE — 95992 CANALITH REPOSITIONING PROC: CPT | Mod: GP | Performed by: PHYSICAL THERAPIST

## 2021-06-07 PROCEDURE — 97161 PT EVAL LOW COMPLEX 20 MIN: CPT | Mod: GP | Performed by: PHYSICAL THERAPIST

## 2021-06-07 NOTE — PROGRESS NOTES
"   06/07/21 0900   Quick Adds   Quick Adds Vestibular Eval   Type of Visit Initial OP PT Evaluation   General Information   Start of Care Date 06/07/21   Referring Physician Adeel Ramirez MD    Orders Evaluate and Treat as Indicated   Order Date 06/04/21   Medical Diagnosis Benign paroxysmal positional vertigo of right ear H81.11   Onset of illness/injury or Date of Surgery 06/04/21  (Referral date used)   Precautions/Limitations no known precautions/limitations   Surgical/Medical history reviewed Yes   Pertinent history of current vestibular problem (include personal factors and/or comorbidities that impact the POC)  Anxiety;Prior concussion(s)   Pertinent history of current problem (include personal factors and/or comorbidities that impact the POC) PMH of rotator cuff tear, CTS release, hernia repair, and anxiety. Patient reports dizziness started about 1 month ago, rolled over in bed to open his dog's kennel and felt like he was flipping out of an airplane. Patient notes that week was really bad, especially with rolling to the right. Patient notes now if he stands up too fast or moves too quickly, still feels the same. Patient is propping up on pillows to sleep to try and find a spot that won't make him dizzy. Balance is impaired overall, feels more unstable with moving quickly or is really bad with getting up at night- has run into his dresser. Patient is cautious with his movement to avoid exacerbating symptoms. Dizziness lasts 5 seconds but is very disorienting. No nausea or vomiting, but notes some allergy/sinus pressure - now resolved. No tinnitus, numbness or tingling. He goes to the chiropractor regularly for his neck and back - fell of a ladder previously, also played football and feels like it has caught up to him. He has had a \"handful\" of concussions, had dizziness with the 'bad\" ones. Patient denies hearing or vision changes.    Pertinent Visual History  No acute changes   Prior level of function " comment Patient was previously IND with all functional mobility and ADLs. Patient goes to the gym and does weight lifting - primarily machines, also does jogging or elliptical. No limitations.    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 No   Have you fallen and had an injury in the past year? No   Timed Up and Go score (seconds) NT, see FGA   Is patient a fall risk? No   System Outcome Measures   Dizziness Handicap Inventory (score out of 100) A decrease in score by 17.18 or greater indicates a clinically significant change in symptoms. 22   Pain   Patient currently in pain No  (not currently)   Pain comments Neck, shoulder, and back pain - normal - takes daily ibuprofen    Vitals Signs   Heart Rate 91   SpO2 99   Blood Pressure 110/64   Vital Signs Comments Seated, resting, left arm   Cognitive Status Examination   Orientation orientation to person, place and time   Level of Consciousness alert   Follows Commands and Answers Questions able to follow multistep instructions;100% of the time   Personal Safety and Judgment intact   Memory intact   Integumentary   Integumentary No deficits were identified   Posture   Posture Forward head position   Bed Mobility   Bed Mobility Comments Dizziness with lying down and rolling to the right   Gait   Gait Comments Patient ambulates with good speed, mild instability with head movements   Gait Special Tests   Gait Special Tests FUNCTIONAL GAIT ASSESSMENT   Gait Special Tests Functional Gait Assessment Score out of 30   Score out of 30 27   Comments WFL   Balance Special  "Tests   Balance Special Tests Modified CTSIB Conditions   Balance Special Tests Modified CTSIB Conditions   Condition 1, seconds 30 Seconds   Condition 2, seconds 30 Seconds   Condition 4, seconds 30 Seconds   Condition 5, seconds 30 Seconds   Modified CTSIB Comments WNL but increased postural sway on condition 5   Sensory Examination   Sensory Perception no deficits were identified   Coordination   Coordination no deficits were identified   Muscle Tone   Muscle Tone no deficits were identified   Cervicogenic Screen   Neck ROM WNL   Oculomotor Exam   Smooth Pursuit Normal   Saccades Normal   Convergence Testing Normal   Infrared Goggle Exam or Frenzel Lense Exam   Vestibular Suppressant in Last 24 Hours? No   Exam completed with Infrared Goggles   Spontaneous Nystagmus Negative   Spontaneous Nystagmus comments Room light and fixation removed   Gaze Evoked Nystagmus Negative   Gaze Evoked Nystagmus comments Room light and fixation removed   Head Shake Horizontal Nystagmus Negative   [place]-Hallpike (right) Upbeating R torsional   [place]-Hallpike (right) comments Immediate onset, 10 sec duration, strong symptoms   [place]-Hallpike (Left) Upbeating L torsional   Michelle-Hallpike (left) comments 2 sec latency, 60\" duration, mild symptoms   [facility] Supine Roll Test (Right) Upbeating R torsional   [facility] Supine Roll Test (Right) Comments 2 sec latency, 8-10 sec duration, mild symptoms   [facility] Supine Roll Test (Left) Negative   BPPV Canal(s) R Posterior;L Posterior  (Possible left post as well)   BPPV Type Canalithasis;Cupulolithasis   Modality Interventions   Planned Modality Interventions Comments Per therapist discretion   Planned Therapy Interventions   Planned Therapy Interventions balance training;gait training;neuromuscular re-education   Planned Therapy Interventions Comment Canalith repositioning maneuvers   Clinical Impression   Criteria for Skilled Therapeutic Interventions Met yes, treatment indicated   PT Diagnosis s/s BPPV "   Influenced by the following impairments Positional testing, dizziness, DHI, mild balance impairments   Functional limitations due to impairments Impaired safety and indepence with bed mobility and functional mobility secondary to dizziness   Clinical Presentation Stable/Uncomplicated   Clinical Presentation Rationale Medically stable   Clinical Decision Making (Complexity) Low complexity   Therapy Frequency 1 time/week  (Decreasing in frequency as indicated)   Predicted Duration of Therapy Intervention (days/wks) 2 weeks   Risk & Benefits of therapy have been explained Yes   Patient, Family & other staff in agreement with plan of care Yes   Clinical Impression Comments Patient is a 43 year old male presenting to physical therapy with dizziness indicative of BPPV. Two Epley maneuvers performed for the right posterior canal with no nystagmus and minimal dizziness on the second maneuver. Patient may benefit from skilled physical therapy to resolve his dizziness and facilitate return to PLOF.    GOALS   PT Eval Goals 1;2;3   Goal 1   Goal Identifier DHI   Goal Description Patient will complete the DHI with a score of <4/100 to demonstrate decreased perception of handicap and improved quality of life.    Target Date 06/25/21   Goal 2   Goal Identifier FGA   Goal Description Patient will complete the FGA with a score of 30/30 to demonstrate improved balance and decreased risk for falls.   Target Date 06/25/21   Goal 3   Goal Identifier Position changes   Goal Description Patient will deny dizziness with change of body position for independent bed mobility and transfers for return to daily activities without limitation.   Target Date 06/25/21   Total Evaluation Time   PT Eval, Low Complexity Minutes ([id number]) 20

## 2021-09-26 ENCOUNTER — HEALTH MAINTENANCE LETTER (OUTPATIENT)
Age: 46
End: 2021-09-26

## 2021-10-19 PROBLEM — F32.9 MAJOR DEPRESSION: Status: ACTIVE | Noted: 2018-10-23

## 2022-06-08 ENCOUNTER — VIRTUAL VISIT (OUTPATIENT)
Dept: PSYCHOLOGY | Facility: CLINIC | Age: 47
End: 2022-06-08
Payer: COMMERCIAL

## 2022-06-08 DIAGNOSIS — F43.22 ADJUSTMENT DISORDER WITH ANXIETY: Primary | ICD-10-CM

## 2022-06-08 PROCEDURE — 90834 PSYTX W PT 45 MINUTES: CPT | Mod: 95 | Performed by: SOCIAL WORKER

## 2022-06-08 ASSESSMENT — COLUMBIA-SUICIDE SEVERITY RATING SCALE - C-SSRS
2. HAVE YOU ACTUALLY HAD ANY THOUGHTS OF KILLING YOURSELF?: NO
ATTEMPT SINCE LAST CONTACT: NO
1. SINCE LAST CONTACT, HAVE YOU WISHED YOU WERE DEAD OR WISHED YOU COULD GO TO SLEEP AND NOT WAKE UP?: NO
TOTAL  NUMBER OF INTERRUPTED ATTEMPTS SINCE LAST CONTACT: NO
TOTAL  NUMBER OF ABORTED OR SELF INTERRUPTED ATTEMPTS SINCE LAST CONTACT: NO
SUICIDE, SINCE LAST CONTACT: NO
6. HAVE YOU EVER DONE ANYTHING, STARTED TO DO ANYTHING, OR PREPARED TO DO ANYTHING TO END YOUR LIFE?: NO

## 2022-06-08 ASSESSMENT — PATIENT HEALTH QUESTIONNAIRE - PHQ9
10. IF YOU CHECKED OFF ANY PROBLEMS, HOW DIFFICULT HAVE THESE PROBLEMS MADE IT FOR YOU TO DO YOUR WORK, TAKE CARE OF THINGS AT HOME, OR GET ALONG WITH OTHER PEOPLE: NOT DIFFICULT AT ALL
SUM OF ALL RESPONSES TO PHQ QUESTIONS 1-9: 6
SUM OF ALL RESPONSES TO PHQ QUESTIONS 1-9: 6

## 2022-06-08 NOTE — PROGRESS NOTES
[facility]                                     Progress Note    Patient Name: Dannie Hart  Date: 6/8/2022         Service Type: Individual      Session Start Time: 1104  Session End Time: 1147     Session Length: 38-52    Session #: 1    Attendees: Client    Service Modality:  Video Visit:      Provider verified identity through the following two step process.  Patient provided:  Patient is known previously to provider    Telemedicine Visit: The patient's condition can be safely assessed and treated via synchronous audio and visual telemedicine encounter.      Reason for Telemedicine Visit: Patient has requested telehealth visit    Originating Site (Patient Location): Patient's home    Distant Site (Provider Location): Provider Remote Setting- Home Office    Consent:  The patient/guardian has verbally consented to: the potential risks and benefits of telemedicine (video visit) versus in person care; bill my insurance or make self-payment for services provided; and responsibility for payment of non-covered services.     Patient would like the video invitation sent by:  Send to e-mail at: [email or web address]    Mode of Communication:  Video Conference via [facility]    As the provider I attest to compliance with applicable laws and regulations related to telemedicine.    DATA  Interactive Complexity: No  Crisis: No        Progress Since Last Session (Related to Symptoms / Goals / Homework):   Symptoms: Worsening high anxiety    Homework: Completed in session      Episode of Care Goals: No improvement - CONTEMPLATION (Considering change and yet undecided); Intervened by assessing the negative and positive thinking (ambivalence) about behavior change     Current / Ongoing Stressors and Concerns:    mom being sick      Treatment Objective(s) Addressed in This Session:     Therapist will provide individual therapy to identify triggers to anxiety, gain feedback on helpful coping tools and  thought-reframing techniques, and identify preferred way of being. Tx to include discuss current stressors and interpersonal conflicts and how to cope with these, coaching, diagnostic testing, referral for medication as indicated, use prescribed medication, cognitive restructuring, interpersonal, family therapy, supportive therapy services.     Intervention:   Motivational Interviewing    MI Intervention: Co-Developed Goal: anxiety, Expressed Empathy/Understanding, Supported Autonomy, Collaboration, Evocation, Permission to raise concern or advise and Open-ended questions     Change Talk Expressed by the Patient: Desire to change Ability to change Reasons to change Need to change    Provider Response to Change Talk: E - Evoked more info from patient about behavior change, A - Affirmed patient's thoughts, decisions, or attempts at behavior change, R - Reflected patient's change talk and S - Summarized patient's change talk statements      Assessments completed prior to visit:  The following assessments were completed by patient for this visit:  PHQ9:   PHQ-9 SCORE 3/6/2020 3/30/2020 9/10/2020 9/15/2020 10/5/2020 6/4/2021 6/8/2022   PHQ-9 Total Score MyChart - - - - - - 6 (Mild depression)   PHQ-9 Total Score 14 10 4 6 6 1 6     GAD2:   EVELYN-2 6/8/2022   Feeling nervous, anxious, or on edge 1   Not being able to stop or control worrying 0   EVELYN-2 Total Score 1     PROMIS 10-Global Health (all questions and answers displayed):   PROMIS 10 6/8/2022   In general, would you say your health is: Very good   In general, would you say your quality of life is: Very good   In general, how would you rate your physical health? Very good   In general, how would you rate your mental health, including your mood and your ability to think? Good   In general, how would you rate your satisfaction with your social activities and relationships? Very good   In general, please rate how well you carry out your usual social activities and  roles Very good   To what extent are you able to carry out your everyday physical activities such as walking, climbing stairs, carrying groceries, or moving a chair? Completely   How often have you been bothered by emotional problems such as feeling anxious, depressed or irritable? Rarely   How would you rate your fatigue on average? Mild   How would you rate your pain on average?   0 = No Pain  to  10 = Worst Imaginable Pain 1   In general, would you say your health is: 4   In general, would you say your quality of life is: 4   In general, how would you rate your physical health? 4   In general, how would you rate your mental health, including your mood and your ability to think? 3   In general, how would you rate your satisfaction with your social activities and relationships? 4   In general, please rate how well you carry out your usual social activities and roles. (This includes activities at home, at work and in your community, and responsibilities as a parent, child, spouse, employee, friend, etc.) 4   To what extent are you able to carry out your everyday physical activities such as walking, climbing stairs, carrying groceries, or moving a chair? 5   In the past 7 days, how often have you been bothered by emotional problems such as feeling anxious, depressed, or irritable? 2   In the past 7 days, how would you rate your fatigue on average? 2   In the past 7 days, how would you rate your pain on average, where 0 means no pain, and 10 means worst imaginable pain? 1   Global Mental Health Score 15   Global Physical Health Score 17   PROMIS TOTAL - SUBSCORES 32   Some recent data might be hidden         ASSESSMENT: Current Emotional / Mental Status (status of significant symptoms):   Risk status (Self / Other harm or suicidal ideation)   Patient denies current fears or concerns for personal safety.   Patient denies current or recent suicidal ideation or behaviors.   Patient denies current or recent homicidal  "ideation or behaviors.   Patient denies current or recent self injurious behavior or ideation.   Patient denies other safety concerns.   Patient reports there has been no change in risk factors since their last session.     Patient reports there has been no change in protective factors since their last session.     Recommended that patient call 911 or go to the local ED should there be a change in any of these risk factors.     Appearance:   Appropriate    Eye Contact:   Fair    Psychomotor Behavior: Restless    Attitude:   Cooperative    Orientation:   All   Speech    Rate / Production: Emotional Talkative    Volume:  Normal    Mood:    Anxious  Sad    Affect:    Worrisome    Thought Content:  Clear    Thought Form:  Coherent    Insight:    Fair      Medication Review:   No current psychiatric medications prescribed     Medication Compliance:   NA     Changes in Health Issues:   None reported     Chemical Use Review:   Substance Use: Chemical use reviewed, no active concerns identified      Tobacco Use: No current tobacco use.      Diagnosis:  1. Adjustment disorder with anxiety        Collateral Reports Completed:   Not Applicable    PLAN: (Patient Tasks / Therapist Tasks / Other)  Name anxiety, ground self and manage emotions  Sleep hygiene  Look at all outcomes including worst case and best case and look at \"then what\"        Zelda Salazar, [facility]  6/8/2022                                                         ______________________________________________________________________    Individual Treatment Plan    Patient's Name: Dannie Hart  YOB: 1975    Date of Creation: 6/8/2022  Date Treatment Plan Last Reviewed/Revised: 6/8/2022 due 9/8/2022    DSM5 Diagnoses: Adjustment Disorders  309.24 (F43.22) With anxiety  Psychosocial / Contextual Factors: mom being sick   PROMIS (reviewed every 90 days): 6/8/2022    Referral / Collaboration:  Referral to another professional/service is not " indicated at this time..    Anticipated number of session for this episode of care: 4  Anticipation frequency of session: Monthly  Anticipated Duration of each session: 38-52 minutes  Treatment plan will be reviewed in 90 days or when goals have been changed.       MeasurableTreatment Goal(s) related to diagnosis / functional impairment(s)  Goal 1: Patient will report absence of persistent anxiety mood and report of reduced frequency and intensity of worry and absence of persistent anxious mood to acceptable levels, no panic attacks, report subjective comfort with rumination or a period of 90 days. Within 6 months as clinically observed and by patient self-report    I will know I've met my goal when side effects go away.      Objective #A (Patient Action)    Patient will demonstrate and report a level of anxiety that can be managed at a lower level of care.  Absence of persistent anxious mood and report of reduced frequency and intensity of worry and absence of persistent anxious mood to acceptable levels, no panic attacks, report subjective comfort with rumination for a period of 90 days, within 6 months as clinically observed and by patient self-report.  Status: Restarted - Date: 6/8/2022    Intervention(s)  Therapist will provide individual therapy to identify triggers to anxiety, gain feedback on helpful coping tools and thought-reframing techniques, and identify preferred way of being. Tx to include discuss current stressors and interpersonal conflicts and how to cope with these, coaching, diagnostic testing, referral for medication as indicated, use prescribed medication, cognitive restructuring, interpersonal, family therapy, supportive therapy services.        Patient has reviewed and agreed to the above plan.      Zelda Salazar, [facility]  June 8, 2022

## 2022-07-11 ENCOUNTER — VIRTUAL VISIT (OUTPATIENT)
Dept: PSYCHOLOGY | Facility: CLINIC | Age: 47
End: 2022-07-11
Payer: COMMERCIAL

## 2022-07-11 DIAGNOSIS — F43.22 ADJUSTMENT DISORDER WITH ANXIETY: Primary | ICD-10-CM

## 2022-07-11 PROCEDURE — 90834 PSYTX W PT 45 MINUTES: CPT | Mod: 95 | Performed by: SOCIAL WORKER

## 2022-07-11 NOTE — PROGRESS NOTES
[facility]                                     Progress Note    Patient Name: Dannie Hart  Date: 7/11/2022         Service Type: Individual      Session Start Time: 0906  Session End Time: 0951 lots of tech issues starting and restarting video     Session Length: 38-52    Session #: 2    Attendees: Client    Service Modality:  Video Visit:      Provider verified identity through the following two step process.  Patient provided:  Patient is known previously to provider    Telemedicine Visit: The patient's condition can be safely assessed and treated via synchronous audio and visual telemedicine encounter.      Reason for Telemedicine Visit: Patient has requested telehealth visit    Originating Site (Patient Location): Patient's home    Distant Site (Provider Location): Provider Remote Setting- Home Office    Consent:  The patient/guardian has verbally consented to: the potential risks and benefits of telemedicine (video visit) versus in person care; bill my insurance or make self-payment for services provided; and responsibility for payment of non-covered services.     Patient would like the video invitation sent by:  Send to e-mail at: [email or web address]    Mode of Communication:  Video Conference via [facility]    As the provider I attest to compliance with applicable laws and regulations related to telemedicine.    DATA  Interactive Complexity: No  Crisis: No        Progress Since Last Session (Related to Symptoms / Goals / Homework):   Symptoms: No change anxiety    Homework: Partially completed      Episode of Care Goals: Minimal progress - PREPARATION (Decided to change - considering how); Intervened by negotiating a change plan and determining options / strategies for behavior change, identifying triggers, exploring social supports, and working towards setting a date to begin behavior change     Current / Ongoing Stressors and Concerns:    mom being sick, dreams, being  late     Treatment Objective(s) Addressed in This Session:     Therapist will provide individual therapy to identify triggers to anxiety, gain feedback on helpful coping tools and thought-reframing techniques, and identify preferred way of being. Tx to include discuss current stressors and interpersonal conflicts and how to cope with these, coaching, diagnostic testing, referral for medication as indicated, use prescribed medication, cognitive restructuring, interpersonal, family therapy, supportive therapy services.     Intervention:   Therapist met with patient to review goals and interventions. Therapist utilized reflected listening as patient gave brief reflection of week. Patient reported working on his sleep hygiene and it has been improving his sleep but still struggling with anxiety around time and control along with a reoccurring dream. Therapist supported, validated patient and reiterated the worst case outcome vs patient looking at what till happen when challenging his rigidity. Therapist coached patient in IRT and encouraged patient to implement.    Patient presented with an anxious affect. Patient was engaged in session and open to feedback. Patient reported no safety concerns.     Assessments completed prior to visit:  The following assessments were completed by patient for this visit:  PHQ9:   PHQ-9 SCORE 3/6/2020 3/30/2020 9/10/2020 9/15/2020 10/5/2020 6/4/2021 6/8/2022   PHQ-9 Total Score MyChart - - - - - - 6 (Mild depression)   PHQ-9 Total Score 14 10 4 6 6 1 6     GAD2:   EVELYN-2 6/8/2022 7/11/2022   Feeling nervous, anxious, or on edge 1 1   Not being able to stop or control worrying 0 1   EVELYN-2 Total Score 1 2     PROMIS 10-Global Health (all questions and answers displayed):   PROMIS 10 6/8/2022   In general, would you say your health is: Very good   In general, would you say your quality of life is: Very good   In general, how would you rate your physical health? Very good   In general, how  would you rate your mental health, including your mood and your ability to think? Good   In general, how would you rate your satisfaction with your social activities and relationships? Very good   In general, please rate how well you carry out your usual social activities and roles Very good   To what extent are you able to carry out your everyday physical activities such as walking, climbing stairs, carrying groceries, or moving a chair? Completely   How often have you been bothered by emotional problems such as feeling anxious, depressed or irritable? Rarely   How would you rate your fatigue on average? Mild   How would you rate your pain on average?   0 = No Pain  to  10 = Worst Imaginable Pain 1   In general, would you say your health is: 4   In general, would you say your quality of life is: 4   In general, how would you rate your physical health? 4   In general, how would you rate your mental health, including your mood and your ability to think? 3   In general, how would you rate your satisfaction with your social activities and relationships? 4   In general, please rate how well you carry out your usual social activities and roles. (This includes activities at home, at work and in your community, and responsibilities as a parent, child, spouse, employee, friend, etc.) 4   To what extent are you able to carry out your everyday physical activities such as walking, climbing stairs, carrying groceries, or moving a chair? 5   In the past 7 days, how often have you been bothered by emotional problems such as feeling anxious, depressed, or irritable? 2   In the past 7 days, how would you rate your fatigue on average? 2   In the past 7 days, how would you rate your pain on average, where 0 means no pain, and 10 means worst imaginable pain? 1   Global Mental Health Score 15   Global Physical Health Score 17   PROMIS TOTAL - SUBSCORES 32   Some recent data might be hidden         ASSESSMENT: Current Emotional / Mental  "Status (status of significant symptoms):   Risk status (Self / Other harm or suicidal ideation)   Patient denies current fears or concerns for personal safety.   Patient denies current or recent suicidal ideation or behaviors.   Patient denies current or recent homicidal ideation or behaviors.   Patient denies current or recent self injurious behavior or ideation.   Patient denies other safety concerns.   Patient reports there has been no change in risk factors since their last session.     Patient reports there has been no change in protective factors since their last session.     Recommended that patient call 911 or go to the local ED should there be a change in any of these risk factors.     Appearance:   Appropriate    Eye Contact:   Fair    Psychomotor Behavior: Restless    Attitude:   Cooperative    Orientation:   All   Speech    Rate / Production: Emotional Talkative    Volume:  Normal    Mood:    Anxious    Affect:    Worrisome    Thought Content:  Clear    Thought Form:  Coherent    Insight:    Fair      Medication Review:   No current psychiatric medications prescribed     Medication Compliance:   NA     Changes in Health Issues:   None reported     Chemical Use Review:   Substance Use: Chemical use reviewed, no active concerns identified      Tobacco Use: No current tobacco use.      Diagnosis:  1. Adjustment disorder with anxiety        Collateral Reports Completed:   Not Applicable    PLAN: (Patient Tasks / Therapist Tasks / Other)  Name anxiety, ground self and manage emotions  Continue sleep hygiene  Look at all outcomes including worst case and best case and look at \"then what\"  IRT for chronic dreams      Zelda Salazar, [facility]  7/11/2022                                                         ______________________________________________________________________    Individual Treatment Plan    Patient's Name: Dannie Hart  YOB: 1975    Date of Creation: 6/8/2022  Date Treatment " Plan Last Reviewed/Revised: 6/8/2022 due 9/8/2022    DSM5 Diagnoses: Adjustment Disorders  309.24 (F43.22) With anxiety  Psychosocial / Contextual Factors:  mom being sick, dreams, being late  PROMIS (reviewed every 90 days): 6/8/2022    Referral / Collaboration:  Referral to another professional/service is not indicated at this time..    Anticipated number of session for this episode of care: 4  Anticipation frequency of session: Monthly  Anticipated Duration of each session: 38-52 minutes  Treatment plan will be reviewed in 90 days or when goals have been changed.       MeasurableTreatment Goal(s) related to diagnosis / functional impairment(s)  Goal 1: Patient will report absence of persistent anxiety mood and report of reduced frequency and intensity of worry and absence of persistent anxious mood to acceptable levels, no panic attacks, report subjective comfort with rumination or a period of 90 days. Within 6 months as clinically observed and by patient self-report    I will know I've met my goal when side effects go away.      Objective #A (Patient Action)    Patient will demonstrate and report a level of anxiety that can be managed at a lower level of care.  Absence of persistent anxious mood and report of reduced frequency and intensity of worry and absence of persistent anxious mood to acceptable levels, no panic attacks, report subjective comfort with rumination for a period of 90 days, within 6 months as clinically observed and by patient self-report.  Status: Restarted - Date: 6/8/2022    Intervention(s)  Therapist will provide individual therapy to identify triggers to anxiety, gain feedback on helpful coping tools and thought-reframing techniques, and identify preferred way of being. Tx to include discuss current stressors and interpersonal conflicts and how to cope with these, coaching, diagnostic testing, referral for medication as indicated, use prescribed medication, cognitive restructuring,  interpersonal, family therapy, supportive therapy services.        Patient has reviewed and agreed to the above plan.      Zelda Salazar, [facility]  June 8, 2022

## 2022-08-13 ENCOUNTER — MYC MEDICAL ADVICE (OUTPATIENT)
Dept: FAMILY MEDICINE | Facility: CLINIC | Age: 47
End: 2022-08-13

## 2022-08-13 ENCOUNTER — E-VISIT (OUTPATIENT)
Dept: FAMILY MEDICINE | Facility: CLINIC | Age: 47
End: 2022-08-13
Payer: COMMERCIAL

## 2022-08-13 DIAGNOSIS — F41.9 ANXIETY: ICD-10-CM

## 2022-08-13 PROCEDURE — 99421 OL DIG E/M SVC 5-10 MIN: CPT | Performed by: FAMILY MEDICINE

## 2022-08-13 RX ORDER — HYDROXYZINE HYDROCHLORIDE 25 MG/1
25 TABLET, FILM COATED ORAL EVERY 8 HOURS PRN
Qty: 20 TABLET | Refills: 0 | Status: SHIPPED | OUTPATIENT
Start: 2022-08-13 | End: 2022-09-06

## 2022-08-14 ASSESSMENT — ANXIETY QUESTIONNAIRES
GAD7 TOTAL SCORE: 14
3. WORRYING TOO MUCH ABOUT DIFFERENT THINGS: MORE THAN HALF THE DAYS
7. FEELING AFRAID AS IF SOMETHING AWFUL MIGHT HAPPEN: MORE THAN HALF THE DAYS
1. FEELING NERVOUS, ANXIOUS, OR ON EDGE: MORE THAN HALF THE DAYS
2. NOT BEING ABLE TO STOP OR CONTROL WORRYING: MORE THAN HALF THE DAYS
5. BEING SO RESTLESS THAT IT IS HARD TO SIT STILL: MORE THAN HALF THE DAYS
GAD7 TOTAL SCORE: 14
8. IF YOU CHECKED OFF ANY PROBLEMS, HOW DIFFICULT HAVE THESE MADE IT FOR YOU TO DO YOUR WORK, TAKE CARE OF THINGS AT HOME, OR GET ALONG WITH OTHER PEOPLE?: SOMEWHAT DIFFICULT
GAD7 TOTAL SCORE: 14
7. FEELING AFRAID AS IF SOMETHING AWFUL MIGHT HAPPEN: MORE THAN HALF THE DAYS
IF YOU CHECKED OFF ANY PROBLEMS ON THIS QUESTIONNAIRE, HOW DIFFICULT HAVE THESE PROBLEMS MADE IT FOR YOU TO DO YOUR WORK, TAKE CARE OF THINGS AT HOME, OR GET ALONG WITH OTHER PEOPLE: SOMEWHAT DIFFICULT
4. TROUBLE RELAXING: MORE THAN HALF THE DAYS
6. BECOMING EASILY ANNOYED OR IRRITABLE: MORE THAN HALF THE DAYS

## 2022-08-15 ENCOUNTER — VIRTUAL VISIT (OUTPATIENT)
Dept: PSYCHOLOGY | Facility: CLINIC | Age: 47
End: 2022-08-15
Payer: COMMERCIAL

## 2022-08-15 DIAGNOSIS — F43.22 ADJUSTMENT DISORDER WITH ANXIETY: Primary | ICD-10-CM

## 2022-08-15 PROCEDURE — 90834 PSYTX W PT 45 MINUTES: CPT | Mod: 95 | Performed by: SOCIAL WORKER

## 2022-08-15 NOTE — TELEPHONE ENCOUNTER
Pt completed an evisit and medication was prescribed on 8/13/22.    Nelia Vital RN  [facility]

## 2022-08-15 NOTE — PROGRESS NOTES
[facility]                                     Progress Note    Patient Name: Dannie Hart  Date: 8/15/2022         Service Type: Individual      Session Start Time: 0909  Session End Time: 0958     Session Length: 38-52    Session #: 3    Attendees: Client    Service Modality:  Video Visit:      Provider verified identity through the following two step process.  Patient provided:  Patient is known previously to provider    Telemedicine Visit: The patient's condition can be safely assessed and treated via synchronous audio and visual telemedicine encounter.      Reason for Telemedicine Visit: Patient has requested telehealth visit    Originating Site (Patient Location): Patient's home    Distant Site (Provider Location): Provider Remote Setting- Home Office    Consent:  The patient/guardian has verbally consented to: the potential risks and benefits of telemedicine (video visit) versus in person care; bill my insurance or make self-payment for services provided; and responsibility for payment of non-covered services.     Patient would like the video invitation sent by:  Send to e-mail at: [email or web address]    Mode of Communication:  Video Conference via [employer]    As the provider I attest to compliance with applicable laws and regulations related to telemedicine.    DATA  Interactive Complexity: No  Crisis: No        Progress Since Last Session (Related to Symptoms / Goals / Homework):   Symptoms: Worsening passing of his mother last week    Homework: Partially completed      Episode of Care Goals: Minimal progress - PREPARATION (Decided to change - considering how); Intervened by negotiating a change plan and determining options / strategies for behavior change, identifying triggers, exploring social supports, and working towards setting a date to begin behavior change     Current / Ongoing Stressors and Concerns:    mom being sick, dreams, being late     Treatment  Objective(s) Addressed in This Session:     Therapist will provide individual therapy to identify triggers to anxiety, gain feedback on helpful coping tools and thought-reframing techniques, and identify preferred way of being. Tx to include discuss current stressors and interpersonal conflicts and how to cope with these, coaching, diagnostic testing, referral for medication as indicated, use prescribed medication, cognitive restructuring, interpersonal, family therapy, supportive therapy services.     Intervention:   Therapist met with patient to review goals and interventions. Therapist utilized reflected listening as patient gave brief reflection of week. Patient reported his mother passed last week and processed the loss. Therapist supported patient, validated, provided safe place and provided psycho education around grief and loss. Therapist encouraged patient in moments of struggle with do what feels good such as ask what his mother would have said.  Patient presented with an anxious affect. Patient was engaged in session and open to feedback. Patient reported no safety concerns.     Assessments completed prior to visit:  The following assessments were completed by patient for this visit:  PHQ9:   PHQ-9 SCORE 3/6/2020 3/30/2020 9/10/2020 9/15/2020 10/5/2020 6/4/2021 6/8/2022   PHQ-9 Total Score MyChart - - - - - - 6 (Mild depression)   PHQ-9 Total Score 14 10 4 6 6 1 6     GAD2:   EVELYN-2 6/8/2022 7/11/2022 8/14/2022   Feeling nervous, anxious, or on edge 1 1 2   Not being able to stop or control worrying 0 1 2   EVELYN-2 Total Score 1 2 4     PROMIS 10-Global Health (all questions and answers displayed):   PROMIS 10 6/8/2022   In general, would you say your health is: Very good   In general, would you say your quality of life is: Very good   In general, how would you rate your physical health? Very good   In general, how would you rate your mental health, including your mood and your ability to think? Good   In  general, how would you rate your satisfaction with your social activities and relationships? Very good   In general, please rate how well you carry out your usual social activities and roles Very good   To what extent are you able to carry out your everyday physical activities such as walking, climbing stairs, carrying groceries, or moving a chair? Completely   How often have you been bothered by emotional problems such as feeling anxious, depressed or irritable? Rarely   How would you rate your fatigue on average? Mild   How would you rate your pain on average?   0 = No Pain  to  10 = Worst Imaginable Pain 1   In general, would you say your health is: 4   In general, would you say your quality of life is: 4   In general, how would you rate your physical health? 4   In general, how would you rate your mental health, including your mood and your ability to think? 3   In general, how would you rate your satisfaction with your social activities and relationships? 4   In general, please rate how well you carry out your usual social activities and roles. (This includes activities at home, at work and in your community, and responsibilities as a parent, child, spouse, employee, friend, etc.) 4   To what extent are you able to carry out your everyday physical activities such as walking, climbing stairs, carrying groceries, or moving a chair? 5   In the past 7 days, how often have you been bothered by emotional problems such as feeling anxious, depressed, or irritable? 2   In the past 7 days, how would you rate your fatigue on average? 2   In the past 7 days, how would you rate your pain on average, where 0 means no pain, and 10 means worst imaginable pain? 1   Global Mental Health Score 15   Global Physical Health Score 17   PROMIS TOTAL - SUBSCORES 32   Some recent data might be hidden         ASSESSMENT: Current Emotional / Mental Status (status of significant symptoms):   Risk status (Self / Other harm or suicidal  "ideation)   Patient denies current fears or concerns for personal safety.   Patient denies current or recent suicidal ideation or behaviors.   Patient denies current or recent homicidal ideation or behaviors.   Patient denies current or recent self injurious behavior or ideation.   Patient denies other safety concerns.   Patient reports there has been no change in risk factors since their last session.     Patient reports there has been no change in protective factors since their last session.     Recommended that patient call 911 or go to the local ED should there be a change in any of these risk factors.     Appearance:   Appropriate    Eye Contact:   Fair    Psychomotor Behavior: Restless    Attitude:   Cooperative    Orientation:   All   Speech    Rate / Production: Emotional Talkative    Volume:  Normal    Mood:    Anxious  Sad  Grieving   Affect:    Tearful Worrisome    Thought Content:  Clear    Thought Form:  Coherent    Insight:    Fair      Medication Review:   No current psychiatric medications prescribed     Medication Compliance:   NA     Changes in Health Issues:   None reported     Chemical Use Review:   Substance Use: Chemical use reviewed, no active concerns identified      Tobacco Use: No current tobacco use.      Diagnosis:  1. Adjustment disorder with anxiety        Collateral Reports Completed:   Not Applicable    PLAN: (Patient Tasks / Therapist Tasks / Other)  Name anxiety, ground self and manage emotions  Continue sleep hygiene  Look at all outcomes including worst case and best case and look at \"then what\"  IRT for chronic dreams   encouraged patient in moments of struggle with do what feels good such as ask what his mother would have said.    Zelda Salazar, LICSW  8/15/2022                                                         ______________________________________________________________________    Individual Treatment Plan    Patient's Name: Dannie Hart  Date Of " Birth: 1975    Date of Creation: 6/8/2022  Date Treatment Plan Last Reviewed/Revised: 6/8/2022 due 9/8/2022    DSM5 Diagnoses: Adjustment Disorders  309.24 (F43.22) With anxiety  Psychosocial / Contextual Factors:  mom being sick, dreams, being late  PROMIS (reviewed every 90 days): 6/8/2022    Referral / Collaboration:  Referral to another professional/service is not indicated at this time..    Anticipated number of session for this episode of care: 4  Anticipation frequency of session: Monthly  Anticipated Duration of each session: 38-52 minutes  Treatment plan will be reviewed in 90 days or when goals have been changed.       MeasurableTreatment Goal(s) related to diagnosis / functional impairment(s)  Goal 1: Patient will report absence of persistent anxiety mood and report of reduced frequency and intensity of worry and absence of persistent anxious mood to acceptable levels, no panic attacks, report subjective comfort with rumination or a period of 90 days. Within 6 months as clinically observed and by patient self-report    I will know I've met my goal when side effects go away.      Objective #A (Patient Action)    Patient will demonstrate and report a level of anxiety that can be managed at a lower level of care.  Absence of persistent anxious mood and report of reduced frequency and intensity of worry and absence of persistent anxious mood to acceptable levels, no panic attacks, report subjective comfort with rumination for a period of 90 days, within 6 months as clinically observed and by patient self-report.  Status: Restarted - Date: 6/8/2022    Intervention(s)  Therapist will provide individual therapy to identify triggers to anxiety, gain feedback on helpful coping tools and thought-reframing techniques, and identify preferred way of being. Tx to include discuss current stressors and interpersonal conflicts and how to cope with these, coaching, diagnostic testing, referral for medication as  indicated, use prescribed medication, cognitive restructuring, interpersonal, family therapy, supportive therapy services.        Patient has reviewed and agreed to the above plan.      Zelda Salazar, [facility]  June 8, 2022

## 2022-08-28 ENCOUNTER — HEALTH MAINTENANCE LETTER (OUTPATIENT)
Age: 47
End: 2022-08-28

## 2022-09-02 ENCOUNTER — VIRTUAL VISIT (OUTPATIENT)
Dept: PSYCHOLOGY | Facility: CLINIC | Age: 47
End: 2022-09-02
Payer: COMMERCIAL

## 2022-09-02 DIAGNOSIS — F41.1 GENERALIZED ANXIETY DISORDER: Primary | ICD-10-CM

## 2022-09-02 PROCEDURE — 90837 PSYTX W PT 60 MINUTES: CPT | Mod: 95 | Performed by: SOCIAL WORKER

## 2022-09-02 NOTE — PROGRESS NOTES
[facility]                                     Progress Note    Patient Name: Dannie Hart  Date: 9/2/2022         Service Type: Individual      Session Start Time: 0908  Session End Time: 1005     Session Length: 53+    Session #: 4    Attendees: Client    Service Modality:  Video Visit:      Provider verified identity through the following two step process.  Patient provided:  Patient is known previously to provider    Telemedicine Visit: The patient's condition can be safely assessed and treated via synchronous audio and visual telemedicine encounter.      Reason for Telemedicine Visit: Patient has requested telehealth visit    Originating Site (Patient Location): Patient's home    Distant Site (Provider Location): Provider Remote Setting- Home Office    Consent:  The patient/guardian has verbally consented to: the potential risks and benefits of telemedicine (video visit) versus in person care; bill my insurance or make self-payment for services provided; and responsibility for payment of non-covered services.     Patient would like the video invitation sent by:  Send to e-mail at: [email or web address]    Mode of Communication:  Video Conference via Amwell    As the provider I attest to compliance with applicable laws and regulations related to telemedicine.    DATA  Interactive Complexity: No  Crisis: No        Progress Since Last Session (Related to Symptoms / Goals / Homework):   Symptoms: Worsening passing of his mother last week    Homework: Partially completed      Episode of Care Goals: Minimal progress - PREPARATION (Decided to change - considering how); Intervened by negotiating a change plan and determining options / strategies for behavior change, identifying triggers, exploring social supports, and working towards setting a date to begin behavior change     Current / Ongoing Stressors and Concerns:    mom being sick, dreams, being late     Treatment Objective(s)  Addressed in This Session:     Therapist will provide individual therapy to identify triggers to anxiety, gain feedback on helpful coping tools and thought-reframing techniques, and identify preferred way of being. Tx to include discuss current stressors and interpersonal conflicts and how to cope with these, coaching, diagnostic testing, referral for medication as indicated, use prescribed medication, cognitive restructuring, interpersonal, family therapy, supportive therapy services.     Intervention:   Therapist met with patient to review goals and interventions. Therapist utilized reflected listening as patient gave brief reflection of week. Patient reported having a difficult time with grief, emotions and feeling overwhelmed. Therapist supported and validated patient. Therapist provided patient with education on the grieving process along with coaching patient through managing emotions.    Patient presented with an anxious affect. Patient was engaged in session and open to feedback. Patient reported no safety concerns.     Assessments completed prior to visit:  The following assessments were completed by patient for this visit:  PHQ9:   PHQ-9 SCORE 3/6/2020 3/30/2020 9/10/2020 9/15/2020 10/5/2020 6/4/2021 6/8/2022   PHQ-9 Total Score MyChart - - - - - - 6 (Mild depression)   PHQ-9 Total Score 14 10 4 6 6 1 6     GAD2:   EVELYN-2 6/8/2022 7/11/2022 8/14/2022   Feeling nervous, anxious, or on edge 1 1 2   Not being able to stop or control worrying 0 1 2   EVELYN-2 Total Score 1 2 4     PROMIS 10-Global Health (all questions and answers displayed):   PROMIS 10 6/8/2022   In general, would you say your health is: Very good   In general, would you say your quality of life is: Very good   In general, how would you rate your physical health? Very good   In general, how would you rate your mental health, including your mood and your ability to think? Good   In general, how would you rate your satisfaction with your social  activities and relationships? Very good   In general, please rate how well you carry out your usual social activities and roles Very good   To what extent are you able to carry out your everyday physical activities such as walking, climbing stairs, carrying groceries, or moving a chair? Completely   How often have you been bothered by emotional problems such as feeling anxious, depressed or irritable? Rarely   How would you rate your fatigue on average? Mild   How would you rate your pain on average?   0 = No Pain  to  10 = Worst Imaginable Pain 1   In general, would you say your health is: 4   In general, would you say your quality of life is: 4   In general, how would you rate your physical health? 4   In general, how would you rate your mental health, including your mood and your ability to think? 3   In general, how would you rate your satisfaction with your social activities and relationships? 4   In general, please rate how well you carry out your usual social activities and roles. (This includes activities at home, at work and in your community, and responsibilities as a parent, child, spouse, employee, friend, etc.) 4   To what extent are you able to carry out your everyday physical activities such as walking, climbing stairs, carrying groceries, or moving a chair? 5   In the past 7 days, how often have you been bothered by emotional problems such as feeling anxious, depressed, or irritable? 2   In the past 7 days, how would you rate your fatigue on average? 2   In the past 7 days, how would you rate your pain on average, where 0 means no pain, and 10 means worst imaginable pain? 1   Global Mental Health Score 15   Global Physical Health Score 17   PROMIS TOTAL - SUBSCORES 32   Some recent data might be hidden         ASSESSMENT: Current Emotional / Mental Status (status of significant symptoms):   Risk status (Self / Other harm or suicidal ideation)   Patient denies current fears or concerns for personal  "safety.   Patient denies current or recent suicidal ideation or behaviors.   Patient denies current or recent homicidal ideation or behaviors.   Patient denies current or recent self injurious behavior or ideation.   Patient denies other safety concerns.   Patient reports there has been no change in risk factors since their last session.     Patient reports there has been no change in protective factors since their last session.     Recommended that patient call 911 or go to the local ED should there be a change in any of these risk factors.     Appearance:   Appropriate    Eye Contact:   Fair    Psychomotor Behavior: Restless    Attitude:   Cooperative    Orientation:   All   Speech    Rate / Production: Emotional Talkative    Volume:  Normal    Mood:    Anxious  Sad  Grieving   Affect:    Tearful Worrisome    Thought Content:  Clear    Thought Form:  Coherent    Insight:    Fair      Medication Review:   No current psychiatric medications prescribed     Medication Compliance:   NA     Changes in Health Issues:   None reported     Chemical Use Review:   Substance Use: Chemical use reviewed, no active concerns identified      Tobacco Use: No current tobacco use.      Diagnosis:  1. Generalized anxiety disorder        Collateral Reports Completed:   Not Applicable    PLAN: (Patient Tasks / Therapist Tasks / Other)  Name anxiety, ground self and manage emotions  Continue sleep hygiene  Look at all outcomes including worst case and best case and look at \"then what\"  IRT for chronic dreams  Allow self to feel the emotions and grieve    Zelda Salazar, [facility] 9/2/2022                                                       ______________________________________________________________________    Individual Treatment Plan    Patient's Name: Dannie Morrishannahlavinia  YOB: 1975    Date of Creation: 6/8/2022  Date Treatment Plan Last Reviewed/Revised: 6/8/2022 due 9/8/2022    DSM5 Diagnoses: 300.02 (F41.1) Generalized " Anxiety Disorder  Psychosocial / Contextual Factors:  mom being sick, dreams, being late  PROMIS (reviewed every 90 days): 6/8/2022    Referral / Collaboration:  Referral to another professional/service is not indicated at this time..    Anticipated number of session for this episode of care: 4  Anticipation frequency of session: Monthly  Anticipated Duration of each session: 38-52 minutes  Treatment plan will be reviewed in 90 days or when goals have been changed.       MeasurableTreatment Goal(s) related to diagnosis / functional impairment(s)  Goal 1: Patient will report absence of persistent anxiety mood and report of reduced frequency and intensity of worry and absence of persistent anxious mood to acceptable levels, no panic attacks, report subjective comfort with rumination or a period of 90 days. Within 6 months as clinically observed and by patient self-report    I will know I've met my goal when side effects go away.      Objective #A (Patient Action)    Patient will demonstrate and report a level of anxiety that can be managed at a lower level of care.  Absence of persistent anxious mood and report of reduced frequency and intensity of worry and absence of persistent anxious mood to acceptable levels, no panic attacks, report subjective comfort with rumination for a period of 90 days, within 6 months as clinically observed and by patient self-report.  Status: Restarted - Date: 6/8/2022    Intervention(s)  Therapist will provide individual therapy to identify triggers to anxiety, gain feedback on helpful coping tools and thought-reframing techniques, and identify preferred way of being. Tx to include discuss current stressors and interpersonal conflicts and how to cope with these, coaching, diagnostic testing, referral for medication as indicated, use prescribed medication, cognitive restructuring, interpersonal, family therapy, supportive therapy services.        Patient has reviewed and agreed to the  above plan.      Autumn MAG Salazar, [facility]  June 8, 2022

## 2022-09-06 RX ORDER — HYDROXYZINE HYDROCHLORIDE 25 MG/1
25 TABLET, FILM COATED ORAL EVERY 8 HOURS PRN
Qty: 20 TABLET | Refills: 0 | Status: SHIPPED | OUTPATIENT
Start: 2022-09-06 | End: 2023-06-21

## 2022-09-26 ENCOUNTER — VIRTUAL VISIT (OUTPATIENT)
Dept: PSYCHOLOGY | Facility: CLINIC | Age: 47
End: 2022-09-26
Payer: COMMERCIAL

## 2022-09-26 ENCOUNTER — OFFICE VISIT (OUTPATIENT)
Dept: FAMILY MEDICINE | Facility: CLINIC | Age: 47
End: 2022-09-26
Payer: COMMERCIAL

## 2022-09-26 VITALS
BODY MASS INDEX: 28.21 KG/M2 | SYSTOLIC BLOOD PRESSURE: 117 MMHG | HEIGHT: 71 IN | OXYGEN SATURATION: 97 % | DIASTOLIC BLOOD PRESSURE: 70 MMHG | HEART RATE: 54 BPM | TEMPERATURE: 97 F | WEIGHT: 201.5 LBS

## 2022-09-26 DIAGNOSIS — F41.1 GENERALIZED ANXIETY DISORDER: Primary | ICD-10-CM

## 2022-09-26 DIAGNOSIS — F41.9 ANXIETY: ICD-10-CM

## 2022-09-26 DIAGNOSIS — Z00.00 ROUTINE GENERAL MEDICAL EXAMINATION AT A HEALTH CARE FACILITY: Primary | ICD-10-CM

## 2022-09-26 DIAGNOSIS — R53.83 FATIGUE, UNSPECIFIED TYPE: ICD-10-CM

## 2022-09-26 DIAGNOSIS — E78.5 HYPERLIPIDEMIA LDL GOAL <100: ICD-10-CM

## 2022-09-26 DIAGNOSIS — Z13.1 SCREENING FOR DIABETES MELLITUS: ICD-10-CM

## 2022-09-26 DIAGNOSIS — Z12.5 SCREENING FOR PROSTATE CANCER: ICD-10-CM

## 2022-09-26 DIAGNOSIS — Z23 ENCOUNTER FOR IMMUNIZATION: ICD-10-CM

## 2022-09-26 LAB
ALBUMIN SERPL-MCNC: 4.1 G/DL (ref 3.4–5)
ALP SERPL-CCNC: 82 U/L (ref 40–150)
ALT SERPL W P-5'-P-CCNC: 52 U/L (ref 0–70)
ANION GAP SERPL CALCULATED.3IONS-SCNC: 4 MMOL/L (ref 3–14)
AST SERPL W P-5'-P-CCNC: 24 U/L (ref 0–45)
BASOPHILS # BLD AUTO: 0 10E3/UL (ref 0–0.2)
BASOPHILS NFR BLD AUTO: 1 %
BILIRUB SERPL-MCNC: 1.1 MG/DL (ref 0.2–1.3)
BUN SERPL-MCNC: 20 MG/DL (ref 7–30)
CALCIUM SERPL-MCNC: 9 MG/DL (ref 8.5–10.1)
CHLORIDE BLD-SCNC: 106 MMOL/L (ref 94–109)
CHOLEST SERPL-MCNC: 159 MG/DL
CO2 SERPL-SCNC: 28 MMOL/L (ref 20–32)
CREAT SERPL-MCNC: 0.98 MG/DL (ref 0.66–1.25)
EOSINOPHIL # BLD AUTO: 0.2 10E3/UL (ref 0–0.7)
EOSINOPHIL NFR BLD AUTO: 3 %
ERYTHROCYTE [DISTWIDTH] IN BLOOD BY AUTOMATED COUNT: 13.3 % (ref 10–15)
FASTING STATUS PATIENT QL REPORTED: YES
GFR SERPL CREATININE-BSD FRML MDRD: >90 ML/MIN/1.73M2
GLUCOSE BLD-MCNC: 95 MG/DL (ref 70–99)
HBA1C MFR BLD: 5.3 % (ref 0–5.6)
HCT VFR BLD AUTO: 49.6 % (ref 40–53)
HDLC SERPL-MCNC: 44 MG/DL
HGB BLD-MCNC: 16.9 G/DL (ref 13.3–17.7)
LDLC SERPL CALC-MCNC: 92 MG/DL
LYMPHOCYTES # BLD AUTO: 1.9 10E3/UL (ref 0.8–5.3)
LYMPHOCYTES NFR BLD AUTO: 34 %
MCH RBC QN AUTO: 30.1 PG (ref 26.5–33)
MCHC RBC AUTO-ENTMCNC: 34.1 G/DL (ref 31.5–36.5)
MCV RBC AUTO: 88 FL (ref 78–100)
MONOCYTES # BLD AUTO: 0.5 10E3/UL (ref 0–1.3)
MONOCYTES NFR BLD AUTO: 9 %
NEUTROPHILS # BLD AUTO: 3 10E3/UL (ref 1.6–8.3)
NEUTROPHILS NFR BLD AUTO: 53 %
NONHDLC SERPL-MCNC: 115 MG/DL
PLATELET # BLD AUTO: 273 10E3/UL (ref 150–450)
POTASSIUM BLD-SCNC: 4.1 MMOL/L (ref 3.4–5.3)
PROT SERPL-MCNC: 7.6 G/DL (ref 6.8–8.8)
PSA SERPL-MCNC: 3.26 UG/L (ref 0–4)
RBC # BLD AUTO: 5.62 10E6/UL (ref 4.4–5.9)
SODIUM SERPL-SCNC: 138 MMOL/L (ref 133–144)
TRIGL SERPL-MCNC: 113 MG/DL
TSH SERPL DL<=0.005 MIU/L-ACNC: 2.56 MU/L (ref 0.4–4)
WBC # BLD AUTO: 5.6 10E3/UL (ref 4–11)

## 2022-09-26 PROCEDURE — 36415 COLL VENOUS BLD VENIPUNCTURE: CPT | Performed by: FAMILY MEDICINE

## 2022-09-26 PROCEDURE — 90686 IIV4 VACC NO PRSV 0.5 ML IM: CPT | Performed by: FAMILY MEDICINE

## 2022-09-26 PROCEDURE — 99214 OFFICE O/P EST MOD 30 MIN: CPT | Mod: 25 | Performed by: FAMILY MEDICINE

## 2022-09-26 PROCEDURE — G0103 PSA SCREENING: HCPCS | Performed by: FAMILY MEDICINE

## 2022-09-26 PROCEDURE — 90471 IMMUNIZATION ADMIN: CPT | Performed by: FAMILY MEDICINE

## 2022-09-26 PROCEDURE — 80061 LIPID PANEL: CPT | Performed by: FAMILY MEDICINE

## 2022-09-26 PROCEDURE — 83036 HEMOGLOBIN GLYCOSYLATED A1C: CPT | Performed by: FAMILY MEDICINE

## 2022-09-26 PROCEDURE — 80050 GENERAL HEALTH PANEL: CPT | Performed by: FAMILY MEDICINE

## 2022-09-26 PROCEDURE — 99396 PREV VISIT EST AGE 40-64: CPT | Mod: 25 | Performed by: FAMILY MEDICINE

## 2022-09-26 PROCEDURE — 90834 PSYTX W PT 45 MINUTES: CPT | Mod: 95 | Performed by: SOCIAL WORKER

## 2022-09-26 ASSESSMENT — PATIENT HEALTH QUESTIONNAIRE - PHQ9
SUM OF ALL RESPONSES TO PHQ QUESTIONS 1-9: 9
10. IF YOU CHECKED OFF ANY PROBLEMS, HOW DIFFICULT HAVE THESE PROBLEMS MADE IT FOR YOU TO DO YOUR WORK, TAKE CARE OF THINGS AT HOME, OR GET ALONG WITH OTHER PEOPLE: SOMEWHAT DIFFICULT
SUM OF ALL RESPONSES TO PHQ QUESTIONS 1-9: 9

## 2022-09-26 ASSESSMENT — PAIN SCALES - GENERAL: PAINLEVEL: NO PAIN (0)

## 2022-09-26 NOTE — PROGRESS NOTES
[facility]                                     Progress Note    Patient Name: Dannie Hart  Date: 9/26/2022         Service Type: Individual      Session Start Time: 1305  Session End Time: 1353     Session Length: 38-52    Session #: 5    Attendees: Client    Service Modality:  Video Visit:      Provider verified identity through the following two step process.  Patient provided:  Patient is known previously to provider    Telemedicine Visit: The patient's condition can be safely assessed and treated via synchronous audio and visual telemedicine encounter.      Reason for Telemedicine Visit: Patient has requested telehealth visit    Originating Site (Patient Location): Patient's home    Distant Site (Provider Location): Provider Remote Setting- Home Office    Consent:  The patient/guardian has verbally consented to: the potential risks and benefits of telemedicine (video visit) versus in person care; bill my insurance or make self-payment for services provided; and responsibility for payment of non-covered services.     Patient would like the video invitation sent by:  Send to e-mail at: [email or web address]    Mode of Communication:  Video Conference via Amwell    As the provider I attest to compliance with applicable laws and regulations related to telemedicine.    DATA  Interactive Complexity: No  Crisis: No        Progress Since Last Session (Related to Symptoms / Goals / Homework):   Symptoms: Worsening grief and loss    Homework: Partially completed      Episode of Care Goals: Minimal progress - PREPARATION (Decided to change - considering how); Intervened by negotiating a change plan and determining options / strategies for behavior change, identifying triggers, exploring social supports, and working towards setting a date to begin behavior change     Current / Ongoing Stressors and Concerns:    mom being sick, dreams, being late     Treatment Objective(s) Addressed in  This Session:     Therapist will provide individual therapy to identify triggers to anxiety, gain feedback on helpful coping tools and thought-reframing techniques, and identify preferred way of being. Tx to include discuss current stressors and interpersonal conflicts and how to cope with these, coaching, diagnostic testing, referral for medication as indicated, use prescribed medication, cognitive restructuring, interpersonal, family therapy, supportive therapy services.     Intervention:   Therapist met with patient to review goals and interventions. Therapist utilized reflected listening as patient gave brief reflection of week. Patient reported a difficult couple of weeks and processed. Therapist supported patient as he processed and validated patient. Therapist provided patient with education on grief and loss along with managing his grief. Therapist assisted patient with exercise with patient asking why and having patient answer that question with the distortion the outcome being different. Patient to look at not asking why and focusing on the grief of losing mom.    Patient presented with an anxious affect. Patient was engaged in session and open to feedback. Patient reported no safety concerns.     Assessments completed prior to visit:  The following assessments were completed by patient for this visit:  PHQ9:   PHQ-9 SCORE 3/30/2020 9/10/2020 9/15/2020 10/5/2020 6/4/2021 6/8/2022 9/26/2022   PHQ-9 Total Score [facility] - - - - - 6 (Mild depression) 9 (Mild depression)   PHQ-9 Total Score 10 4 6 6 1 6 9     GAD2:   EVELYN-2 6/8/2022 7/11/2022 8/14/2022 9/26/2022   Feeling nervous, anxious, or on edge 1 1 2 1   Not being able to stop or control worrying 0 1 2 1   EVELYN-2 Total Score 1 2 4 2     PROMIS 10-Global Health (all questions and answers displayed):   PROMIS 10 6/8/2022   In general, would you say your health is: Very good   In general, would you say your quality of life is: Very good   In general, how would  you rate your physical health? Very good   In general, how would you rate your mental health, including your mood and your ability to think? Good   In general, how would you rate your satisfaction with your social activities and relationships? Very good   In general, please rate how well you carry out your usual social activities and roles Very good   To what extent are you able to carry out your everyday physical activities such as walking, climbing stairs, carrying groceries, or moving a chair? Completely   How often have you been bothered by emotional problems such as feeling anxious, depressed or irritable? Rarely   How would you rate your fatigue on average? Mild   How would you rate your pain on average?   0 = No Pain  to  10 = Worst Imaginable Pain 1   In general, would you say your health is: 4   In general, would you say your quality of life is: 4   In general, how would you rate your physical health? 4   In general, how would you rate your mental health, including your mood and your ability to think? 3   In general, how would you rate your satisfaction with your social activities and relationships? 4   In general, please rate how well you carry out your usual social activities and roles. (This includes activities at home, at work and in your community, and responsibilities as a parent, child, spouse, employee, friend, etc.) 4   To what extent are you able to carry out your everyday physical activities such as walking, climbing stairs, carrying groceries, or moving a chair? 5   In the past 7 days, how often have you been bothered by emotional problems such as feeling anxious, depressed, or irritable? 2   In the past 7 days, how would you rate your fatigue on average? 2   In the past 7 days, how would you rate your pain on average, where 0 means no pain, and 10 means worst imaginable pain? 1   Global Mental Health Score 15   Global Physical Health Score 17   PROMIS TOTAL - SUBSCORES 32   Some recent data  might be hidden         ASSESSMENT: Current Emotional / Mental Status (status of significant symptoms):   Risk status (Self / Other harm or suicidal ideation)   Patient denies current fears or concerns for personal safety.   Patient denies current or recent suicidal ideation or behaviors.   Patient denies current or recent homicidal ideation or behaviors.   Patient denies current or recent self injurious behavior or ideation.   Patient denies other safety concerns.   Patient reports there has been no change in risk factors since their last session.     Patient reports there has been no change in protective factors since their last session.     Recommended that patient call 911 or go to the local ED should there be a change in any of these risk factors.     Appearance:   Appropriate    Eye Contact:   Fair    Psychomotor Behavior: Restless    Attitude:   Cooperative    Orientation:   All   Speech    Rate / Production: Emotional Talkative    Volume:  Normal    Mood:    Anxious  Sad  Grieving   Affect:    Tearful Worrisome    Thought Content:  Clear    Thought Form:  Coherent    Insight:    Fair      Medication Review:   No current psychiatric medications prescribed     Medication Compliance:   NA     Changes in Health Issues:   None reported     Chemical Use Review:   Substance Use: Chemical use reviewed, no active concerns identified      Tobacco Use: No current tobacco use.      Diagnosis:  1. Generalized anxiety disorder        Collateral Reports Completed:   Not Applicable    PLAN: (Patient Tasks / Therapist Tasks / Other)  Therapist assisted patient with exercise with patient asking why and having patient answer that question with the distortion the outcome being different. Patient to look at not asking why and focusing on the grief of losing mom.      Zelda Salazar, [facility] 9/26/2022                                                        ______________________________________________________________________    Individual Treatment Plan    Patient's Name: Dannie Hart  YOB: 1975    Date of Creation: 6/8/2022  Date Treatment Plan Last Reviewed/Revised:9/26/2022 due 12/26/2022    DSM5 Diagnoses: 300.02 (F41.1) Generalized Anxiety Disorder  Psychosocial / Contextual Factors:  mom being sick, dreams, being late  PROMIS (reviewed every 90 days): 6/8/2022    Referral / Collaboration:  Referral to another professional/service is not indicated at this time..    Anticipated number of session for this episode of care: 4  Anticipation frequency of session: Monthly  Anticipated Duration of each session: 38-52 minutes  Treatment plan will be reviewed in 90 days or when goals have been changed.       MeasurableTreatment Goal(s) related to diagnosis / functional impairment(s)  Goal 1: Patient will report absence of persistent anxiety mood and report of reduced frequency and intensity of worry and absence of persistent anxious mood to acceptable levels, no panic attacks, report subjective comfort with rumination or a period of 90 days. Within 6 months as clinically observed and by patient self-report    I will know I've met my goal when side effects go away.      Objective #A (Patient Action)    Patient will demonstrate and report a level of anxiety that can be managed at a lower level of care.  Absence of persistent anxious mood and report of reduced frequency and intensity of worry and absence of persistent anxious mood to acceptable levels, no panic attacks, report subjective comfort with rumination for a period of 90 days, within 6 months as clinically observed and by patient self-report.  Status: continued - Date:9/26/2022    Intervention(s)  Therapist will provide individual therapy to identify triggers to anxiety, gain feedback on helpful coping tools and thought-reframing techniques, and identify preferred way of being. Tx to include  discuss current stressors and interpersonal conflicts and how to cope with these, coaching, diagnostic testing, referral for medication as indicated, use prescribed medication, cognitive restructuring, interpersonal, family therapy, supportive therapy services.        Patient has reviewed and agreed to the above plan.      Zelda Salazar, [facility]  9/26/2022

## 2022-09-26 NOTE — PATIENT INSTRUCTIONS
Stay at 50 mg of sertraline daily    In 2-3 months do a phone visit with Dr. Ramirez.  Call or be seen sooner if needed.    Keep working on healthy diet/exercise     We will send you lab results                Preventive Health Recommendations  Male Ages 40 to 49    Yearly exam:             See your health care provider every year in order to  o   Review health changes.   o   Discuss preventive care.    o   Review your medicines if your doctor has prescribed any.  You should be tested each year for STDs (sexually transmitted diseases) if you re at risk.   Have a cholesterol test every 5 years.   Have a colonoscopy (test for colon cancer) if someone in your family has had colon cancer or polyps before age 50.   After age 45, have a diabetes test (fasting glucose). If you are at risk for diabetes, you should have this test every 3 years.    Talk with your health care provider about whether or not a prostate cancer screening test (PSA) is right for you.    Shots: Get a flu shot each year. Get a tetanus shot every 10 years.     Nutrition:  Eat at least 5 servings of fruits and vegetables daily.   Eat whole-grain bread, whole-wheat pasta and brown rice instead of white grains and rice.   Get adequate Calcium and Vitamin D.     Lifestyle  Exercise for at least 150 minutes a week (30 minutes a day, 5 days a week). This will help you control your weight and prevent disease.   Limit alcohol to one drink per day.   No smoking.   Wear sunscreen to prevent skin cancer.   See your dentist every six months for an exam and cleaning.

## 2022-09-26 NOTE — PROGRESS NOTES
SUBJECTIVE:   CC: Dannie is an 46 year old who presents for preventative health visit.         Patient has been advised of split billing requirements and indicates understanding: Yes  Healthy Habits:   PHQ-2 Total Score: 2            Today's PHQ-2 Score:   PHQ-2 (  [employer]) 2021   Q1: Little interest or pleasure in doing things 0   Q2: Feeling down, depressed or hopeless 0   PHQ-2 Score 0   PHQ-2 Total Score (12-17 Years)- Positive if 3 or more points; Administer PHQ-A if positive 0   Q1: Little interest or pleasure in doing things Not at all   Q2: Feeling down, depressed or hopeless Not at all   PHQ-2 Score 0       Abuse: Current or Past(Physical, Sexual or Emotional)- No  Do you feel safe in your environment? Yes        Social History     Tobacco Use     Smoking status: Never Smoker     Smokeless tobacco: Never Used   Substance Use Topics     Alcohol use: Yes     Comment: socially     If you drink alcohol do you typically have >3 drinks per day or >7 drinks per week? Yes       No flowsheet data found.    Last PSA:   PSA   Date Value Ref Range Status   2021 3.89 0 - 4 ug/L Final     Comment:     Assay Method:  Chemiluminescence using Siemens Vista analyzer       Reviewed orders with patient. 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    Kids in good health    Always tired    No sleep apnea    Very light sleeper    Some diarrhea      OBJECTIVE:   BP (!)  "131/90 (BP Location: Left arm, Patient Position: Chair, Cuff Size: Adult Regular)   Pulse 54   Temp 97  F (36.1  C) (Temporal)   Ht 1.813 m (5' 11.36\")   Wt 91.4 kg (201 lb 8 oz)   SpO2 97%   BMI 27.82 kg/m      Physical Exam  GENERAL: healthy, alert and no distress  EYES: Eyes grossly normal to inspection, PERRL and conjunctivae and sclerae normal  HENT: ear canals and TM's normal, nose and mouth without ulcers or lesions  NECK: no adenopathy, no asymmetry, masses, or scars and thyroid normal to palpation  RESP: lungs clear to auscultation - no rales, rhonchi or wheezes  CV: regular rate and rhythm, normal S1 S2, no S3 or S4, no murmur, click or rub, no peripheral edema and peripheral pulses strong  ABDOMEN: soft, nontender, no hepatosplenomegaly, no masses and bowel sounds normal  MS: no gross musculoskeletal defects noted, no edema  SKIN: no suspicious lesions or rashes  NEURO: Normal strength and tone, mentation intact and speech normal  PSYCH: mentation appears normal, affect normal/bright    Diagnostic Test Results:  Labs reviewed in Epic    ASSESSMENT/PLAN:   Dannie was seen today for physical.    Diagnoses and all orders for this visit:    Routine general medical examination at a health care facility    Encounter for immunization  -     INFLUENZA VACCINE IM > 6 MONTHS VALENT IIV4 (AFLURIA/FLUZONE)  -     SCREENING QUESTIONS FOR ADULT IMMUNIZATIONS    Screening for diabetes mellitus  -     Hemoglobin A1c; Future  -     Hemoglobin A1c    Screening for prostate cancer  -     Prostate Specific Antigen Screen; Future  -     Prostate Specific Antigen Screen    Fatigue, unspecified type  -     CBC with Platelets & Differential; Future  -     TSH with free T4 reflex; Future  -     CBC with Platelets & Differential  -     TSH with free T4 reflex    Hyperlipidemia LDL goal <100  -     Lipid panel reflex to direct LDL Fasting; Future  -     Comprehensive metabolic panel; Future  -     Lipid panel reflex to direct " "LDL Fasting  -     Comprehensive metabolic panel    Anxiety    Discussed several issues with patient  Refill sertraline but do phone visit in 2-3 months  Keep working on healthy diet/exercise   Check labs   Flu shot given    Patient has been advised of split billing requirements and indicates understanding: Yes    COUNSELING:   Reviewed preventive health counseling, as reflected in patient instructions       Regular exercise       Healthy diet/nutrition       Vision screening       Safe sex practices/STD prevention       Prostate cancer screening    Estimated body mass index is 27.82 kg/m  as calculated from the following:    Height as of this encounter: 1.813 m (5' 11.36\").    Weight as of this encounter: 91.4 kg (201 lb 8 oz).     Weight management plan: Discussed healthy diet and exercise guidelines    He reports that he has never smoked. He has never used smokeless tobacco.      Counseling Resources:  ATP IV Guidelines  Pooled Cohorts Equation Calculator  FRAX Risk Assessment  ICSI Preventive Guidelines  Dietary Guidelines for Americans, 2010  USDA's MyPlate  ASA Prophylaxis  Lung CA Screening    Adeel Ramirez MD  [facility] [place]  Answers for HPI/ROS submitted by the patient on 9/26/2022  If you checked off any problems, how difficult have these problems made it for you to do your work, take care of things at home, or get along with other people?: Somewhat difficult  PHQ9 TOTAL SCORE: 9      "

## 2022-09-27 NOTE — RESULT ENCOUNTER NOTE
These labs are all normal.    Sorry again about your mom.    We can do phone visit in 2-3 months.    Take care.    Adeel Ramirez MD

## 2022-10-11 ENCOUNTER — E-VISIT (OUTPATIENT)
Dept: FAMILY MEDICINE | Facility: CLINIC | Age: 47
End: 2022-10-11
Payer: COMMERCIAL

## 2022-10-11 DIAGNOSIS — F41.9 ANXIETY: Primary | ICD-10-CM

## 2022-10-11 PROCEDURE — 99421 OL DIG E/M SVC 5-10 MIN: CPT | Performed by: FAMILY MEDICINE

## 2022-11-04 ENCOUNTER — VIRTUAL VISIT (OUTPATIENT)
Dept: PSYCHOLOGY | Facility: CLINIC | Age: 47
End: 2022-11-04
Payer: COMMERCIAL

## 2022-11-04 DIAGNOSIS — F41.1 GENERALIZED ANXIETY DISORDER: Primary | ICD-10-CM

## 2022-11-04 PROCEDURE — 90834 PSYTX W PT 45 MINUTES: CPT | Mod: 95 | Performed by: SOCIAL WORKER

## 2022-11-04 ASSESSMENT — COLUMBIA-SUICIDE SEVERITY RATING SCALE - C-SSRS
SUICIDE, SINCE LAST CONTACT: NO
TOTAL  NUMBER OF INTERRUPTED ATTEMPTS SINCE LAST CONTACT: NO
ATTEMPT SINCE LAST CONTACT: NO
TOTAL  NUMBER OF ABORTED OR SELF INTERRUPTED ATTEMPTS SINCE LAST CONTACT: NO
1. SINCE LAST CONTACT, HAVE YOU WISHED YOU WERE DEAD OR WISHED YOU COULD GO TO SLEEP AND NOT WAKE UP?: NO
6. HAVE YOU EVER DONE ANYTHING, STARTED TO DO ANYTHING, OR PREPARED TO DO ANYTHING TO END YOUR LIFE?: NO
2. HAVE YOU ACTUALLY HAD ANY THOUGHTS OF KILLING YOURSELF?: NO

## 2022-11-04 ASSESSMENT — PATIENT HEALTH QUESTIONNAIRE - PHQ9
10. IF YOU CHECKED OFF ANY PROBLEMS, HOW DIFFICULT HAVE THESE PROBLEMS MADE IT FOR YOU TO DO YOUR WORK, TAKE CARE OF THINGS AT HOME, OR GET ALONG WITH OTHER PEOPLE: SOMEWHAT DIFFICULT
SUM OF ALL RESPONSES TO PHQ QUESTIONS 1-9: 8
SUM OF ALL RESPONSES TO PHQ QUESTIONS 1-9: 8

## 2022-11-04 ASSESSMENT — ANXIETY QUESTIONNAIRES
GAD7 TOTAL SCORE: 9
GAD7 TOTAL SCORE: 9
IF YOU CHECKED OFF ANY PROBLEMS ON THIS QUESTIONNAIRE, HOW DIFFICULT HAVE THESE PROBLEMS MADE IT FOR YOU TO DO YOUR WORK, TAKE CARE OF THINGS AT HOME, OR GET ALONG WITH OTHER PEOPLE: SOMEWHAT DIFFICULT
2. NOT BEING ABLE TO STOP OR CONTROL WORRYING: SEVERAL DAYS
3. WORRYING TOO MUCH ABOUT DIFFERENT THINGS: SEVERAL DAYS
4. TROUBLE RELAXING: MORE THAN HALF THE DAYS
5. BEING SO RESTLESS THAT IT IS HARD TO SIT STILL: SEVERAL DAYS
6. BECOMING EASILY ANNOYED OR IRRITABLE: SEVERAL DAYS
GAD7 TOTAL SCORE: 9
1. FEELING NERVOUS, ANXIOUS, OR ON EDGE: MORE THAN HALF THE DAYS
7. FEELING AFRAID AS IF SOMETHING AWFUL MIGHT HAPPEN: SEVERAL DAYS
7. FEELING AFRAID AS IF SOMETHING AWFUL MIGHT HAPPEN: SEVERAL DAYS
8. IF YOU CHECKED OFF ANY PROBLEMS, HOW DIFFICULT HAVE THESE MADE IT FOR YOU TO DO YOUR WORK, TAKE CARE OF THINGS AT HOME, OR GET ALONG WITH OTHER PEOPLE?: SOMEWHAT DIFFICULT

## 2022-11-04 NOTE — PROGRESS NOTES
[facility]                                     Progress Note    Patient Name: Dannie Hart  Date: 11/4/2022         Service Type: Individual      Session Start Time: 1202  Session End Time: 1251     Session Length: 38-52    Session #: 6    Attendees: Client    Service Modality:  Video Visit:      Provider verified identity through the following two step process.  Patient provided:  Patient is known previously to provider    Telemedicine Visit: The patient's condition can be safely assessed and treated via synchronous audio and visual telemedicine encounter.      Reason for Telemedicine Visit: Patient has requested telehealth visit    Originating Site (Patient Location): Patient's home    Distant Site (Provider Location): Provider Remote Setting- Home Office    Consent:  The patient/guardian has verbally consented to: the potential risks and benefits of telemedicine (video visit) versus in person care; bill my insurance or make self-payment for services provided; and responsibility for payment of non-covered services.     Patient would like the video invitation sent by:  Send to e-mail at: [email or web address]    Mode of Communication:  Video Conference via Amwell    As the provider I attest to compliance with applicable laws and regulations related to telemedicine.    DATA  Interactive Complexity: No  Crisis: No        Progress Since Last Session (Related to Symptoms / Goals / Homework):   Symptoms: Improving phq-9 ulisses-7    Homework: Achieved / completed to satisfaction      Episode of Care Goals: Minimal progress - PREPARATION (Decided to change - considering how); Intervened by negotiating a change plan and determining options / strategies for behavior change, identifying triggers, exploring social supports, and working towards setting a date to begin behavior change     Current / Ongoing Stressors and Concerns:    mom being sick, dreams, being late     Treatment Objective(s)  Addressed in This Session:     Therapist will provide individual therapy to identify triggers to anxiety, gain feedback on helpful coping tools and thought-reframing techniques, and identify preferred way of being. Tx to include discuss current stressors and interpersonal conflicts and how to cope with these, coaching, diagnostic testing, referral for medication as indicated, use prescribed medication, cognitive restructuring, interpersonal, family therapy, supportive therapy services.     Intervention:   Therapist met with patient to review goals and interventions. Therapist utilized reflected listening as patient gave brief reflection of week. Patient continues to process his grief and loss of his mother. Therapist provides safe place for patient to express his emotions and continues to provide education with the grieving process along with suggestions and validation.   Patient presented with an anxious affect. Patient was engaged in session and open to feedback. Patient reported no safety concerns.     Assessments completed prior to visit:  The following assessments were completed by patient for this visit:  PHQ9:   PHQ-9 SCORE 9/10/2020 9/15/2020 10/5/2020 6/4/2021 6/8/2022 9/26/2022 11/4/2022   PHQ-9 Total Score [facility] - - - - 6 (Mild depression) 9 (Mild depression) 8 (Mild depression)   PHQ-9 Total Score 4 6 6 1 6 9 8     GAD2:   EVELYN-2 6/8/2022 7/11/2022 8/14/2022 9/26/2022   Feeling nervous, anxious, or on edge 1 1 2 1   Not being able to stop or control worrying 0 1 2 1   EVELYN-2 Total Score 1 2 4 2          ASSESSMENT: Current Emotional / Mental Status (status of significant symptoms):   Risk status (Self / Other harm or suicidal ideation)   Patient denies current fears or concerns for personal safety.   Patient denies current or recent suicidal ideation or behaviors.   Patient denies current or recent homicidal ideation or behaviors.   Patient denies current or recent self injurious behavior or  "ideation.   Patient denies other safety concerns.   Patient reports there has been no change in risk factors since their last session.     Patient reports there has been no change in protective factors since their last session.     Recommended that patient call 911 or go to the local ED should there be a change in any of these risk factors.     Appearance:   Appropriate    Eye Contact:   Fair    Psychomotor Behavior: Restless    Attitude:   Cooperative    Orientation:   All   Speech    Rate / Production: Emotional Talkative    Volume:  Normal    Mood:    Anxious  Sad  Grieving   Affect:    Tearful Worrisome    Thought Content:  Clear    Thought Form:  Coherent    Insight:    Fair      Medication Review:   Changes to psychiatric medications, see updated Medication List in EPIC.      Medication Compliance:   NA     Changes in Health Issues:   None reported     Chemical Use Review:   Substance Use: Chemical use reviewed, no active concerns identified      Tobacco Use: No current tobacco use.      Diagnosis:  1. Generalized anxiety disorder        Collateral Reports Completed:   Not Applicable    PLAN: (Patient Tasks / Therapist Tasks / Other)  Read \"Anxiety the missing stage of grief\"       Zelda Salazar, [facility] 11/4/2022                                                       ______________________________________________________________________    Individual Treatment Plan    Patient's Name: Dannie Hart  YOB: 1975    Date of Creation: 6/8/2022  Date Treatment Plan Last Reviewed/Revised:9/26/2022 due 12/26/2022    DSM5 Diagnoses: 300.02 (F41.1) Generalized Anxiety Disorder  Psychosocial / Contextual Factors:  mom being sick, dreams, being late  PROMIS (reviewed every 90 days): 11/4/2022      Referral / Collaboration:  Referral to another professional/service is not indicated at this time..    Anticipated number of session for this episode of care: 4  Anticipation frequency of session: " Monthly  Anticipated Duration of each session: 38-52 minutes  Treatment plan will be reviewed in 90 days or when goals have been changed.       MeasurableTreatment Goal(s) related to diagnosis / functional impairment(s)  Goal 1: Patient will report absence of persistent anxiety mood and report of reduced frequency and intensity of worry and absence of persistent anxious mood to acceptable levels, no panic attacks, report subjective comfort with rumination or a period of 90 days. Within 6 months as clinically observed and by patient self-report    I will know I've met my goal when side effects go away.      Objective #A (Patient Action)    Patient will demonstrate and report a level of anxiety that can be managed at a lower level of care.  Absence of persistent anxious mood and report of reduced frequency and intensity of worry and absence of persistent anxious mood to acceptable levels, no panic attacks, report subjective comfort with rumination for a period of 90 days, within 6 months as clinically observed and by patient self-report.  Status: continued - Date:9/26/2022    Intervention(s)  Therapist will provide individual therapy to identify triggers to anxiety, gain feedback on helpful coping tools and thought-reframing techniques, and identify preferred way of being. Tx to include discuss current stressors and interpersonal conflicts and how to cope with these, coaching, diagnostic testing, referral for medication as indicated, use prescribed medication, cognitive restructuring, interpersonal, family therapy, supportive therapy services.        Patient has reviewed and agreed to the above plan.      Zelda Salazar, [facility]  9/26/2022

## 2022-11-29 ENCOUNTER — VIRTUAL VISIT (OUTPATIENT)
Dept: PSYCHOLOGY | Facility: CLINIC | Age: 47
End: 2022-11-29
Payer: COMMERCIAL

## 2022-11-29 DIAGNOSIS — F41.1 GENERALIZED ANXIETY DISORDER: Primary | ICD-10-CM

## 2022-11-29 PROCEDURE — 90834 PSYTX W PT 45 MINUTES: CPT | Mod: 95 | Performed by: SOCIAL WORKER

## 2022-11-29 ASSESSMENT — ANXIETY QUESTIONNAIRES
7. FEELING AFRAID AS IF SOMETHING AWFUL MIGHT HAPPEN: NOT AT ALL
2. NOT BEING ABLE TO STOP OR CONTROL WORRYING: SEVERAL DAYS
1. FEELING NERVOUS, ANXIOUS, OR ON EDGE: MORE THAN HALF THE DAYS
3. WORRYING TOO MUCH ABOUT DIFFERENT THINGS: SEVERAL DAYS
4. TROUBLE RELAXING: MORE THAN HALF THE DAYS
5. BEING SO RESTLESS THAT IT IS HARD TO SIT STILL: SEVERAL DAYS
GAD7 TOTAL SCORE: 9
IF YOU CHECKED OFF ANY PROBLEMS ON THIS QUESTIONNAIRE, HOW DIFFICULT HAVE THESE PROBLEMS MADE IT FOR YOU TO DO YOUR WORK, TAKE CARE OF THINGS AT HOME, OR GET ALONG WITH OTHER PEOPLE: SOMEWHAT DIFFICULT
GAD7 TOTAL SCORE: 9
8. IF YOU CHECKED OFF ANY PROBLEMS, HOW DIFFICULT HAVE THESE MADE IT FOR YOU TO DO YOUR WORK, TAKE CARE OF THINGS AT HOME, OR GET ALONG WITH OTHER PEOPLE?: SOMEWHAT DIFFICULT
7. FEELING AFRAID AS IF SOMETHING AWFUL MIGHT HAPPEN: NOT AT ALL
GAD7 TOTAL SCORE: 9
6. BECOMING EASILY ANNOYED OR IRRITABLE: MORE THAN HALF THE DAYS

## 2022-11-29 NOTE — PROGRESS NOTES
[facility]                                     Progress Note    Patient Name: Dannie Hart  Date: 11/29/2022         Service Type: Individual      Session Start Time: 1303  Session End Time:  1345     Session Length: 38-52    Session #: 7    Attendees: Client    Service Modality:  Video Visit:      Provider verified identity through the following two step process.  Patient provided:  Patient is known previously to provider    Telemedicine Visit: The patient's condition can be safely assessed and treated via synchronous audio and visual telemedicine encounter.      Reason for Telemedicine Visit: Patient has requested telehealth visit    Originating Site (Patient Location): Patient's home    Distant Site (Provider Location): Provider Remote Setting- Home Office    Consent:  The patient/guardian has verbally consented to: the potential risks and benefits of telemedicine (video visit) versus in person care; bill my insurance or make self-payment for services provided; and responsibility for payment of non-covered services.     Patient would like the video invitation sent by:  Send to e-mail at: [email or web address]    Mode of Communication:  Video Conference via [employer]well    As the provider I attest to compliance with applicable laws and regulations related to telemedicine.    DATA  Interactive Complexity: No  Crisis: No        Progress Since Last Session (Related to Symptoms / Goals / Homework):   Symptoms: No change grief and loss    Homework: Achieved / completed to satisfaction      Episode of Care Goals: Minimal progress - PREPARATION (Decided to change - considering how); Intervened by negotiating a change plan and determining options / strategies for behavior change, identifying triggers, exploring social supports, and working towards setting a date to begin behavior change     Current / Ongoing Stressors and Concerns:    mom being sick, dreams, being late     Treatment  Objective(s) Addressed in This Session:     Therapist will provide individual therapy to identify triggers to anxiety, gain feedback on helpful coping tools and thought-reframing techniques, and identify preferred way of being. Tx to include discuss current stressors and interpersonal conflicts and how to cope with these, coaching, diagnostic testing, referral for medication as indicated, use prescribed medication, cognitive restructuring, interpersonal, family therapy, supportive therapy services.     Intervention:   Therapist met with patient to review goals and interventions. Therapist utilized reflected listening as patient gave brief reflection of week. Patient processed his continued grief of his mom with the holiday and holidays approaching. Therapist supported patient, held safe place for patient and validated patient. Therapist and patient made goal of attempting to be present in happy moments. Therapist suggested patient look at small moments and be internatal looking ahead in these moments and look to shelf grief if it interferes.    Patient presented with an anxious affect. Patient was engaged in session and open to feedback. Patient reported no safety concerns.     Assessments completed prior to visit:  The following assessments were completed by patient for this visit:  PHQ9:   PHQ-9 SCORE 9/10/2020 9/15/2020 10/5/2020 6/4/2021 6/8/2022 9/26/2022 11/4/2022   PHQ-9 Total Score [facility] - - - - 6 (Mild depression) 9 (Mild depression) 8 (Mild depression)   PHQ-9 Total Score 4 6 6 1 6 9 8     GAD2:   EVELYN-2 6/8/2022 7/11/2022 8/14/2022 9/26/2022 11/29/2022   Feeling nervous, anxious, or on edge 1 1 2 1 2   Not being able to stop or control worrying 0 1 2 1 2   EVELYN-2 Total Score 1 2 4 2 4          ASSESSMENT: Current Emotional / Mental Status (status of significant symptoms):   Risk status (Self / Other harm or suicidal ideation)   Patient denies current fears or concerns for personal safety.   Patient denies  "current or recent suicidal ideation or behaviors.   Patient denies current or recent homicidal ideation or behaviors.   Patient denies current or recent self injurious behavior or ideation.   Patient denies other safety concerns.   Patient reports there has been no change in risk factors since their last session.     Patient reports there has been no change in protective factors since their last session.     Recommended that patient call 911 or go to the local ED should there be a change in any of these risk factors.     Appearance:   Appropriate    Eye Contact:   Fair    Psychomotor Behavior: Restless    Attitude:   Cooperative    Orientation:   All   Speech    Rate / Production: Emotional Talkative    Volume:  Normal    Mood:    Anxious  Sad  Grieving   Affect:    Tearful Worrisome    Thought Content:  Clear    Thought Form:  Coherent    Insight:    Fair      Medication Review:   Changes to psychiatric medications, see updated Medication List in EPIC.      Medication Compliance:   NA     Changes in Health Issues:   None reported     Chemical Use Review:   Substance Use: Chemical use reviewed, no active concerns identified      Tobacco Use: No current tobacco use.      Diagnosis:  1. Generalized anxiety disorder        Collateral Reports Completed:   Not Applicable    PLAN: (Patient Tasks / Therapist Tasks / Other)  Continue to read \"Anxiety the missing stage of grief\"   Therapist and patient made goal of attempting to be present in happy moments. Therapist suggested patient look at small moments and be internatal looking ahead in these moments and look to shelf grief if it interferes.     Zelda Salazar, LICSW  11/29/2022    ______________________________________________________________________    Individual Treatment Plan    Patient's Name: Dannie Hart  YOB: 1975    Date of Creation: 6/8/2022  Date Treatment Plan Last Reviewed/Revised:9/26/2022 due 12/26/2022    DSM5 Diagnoses: 300.02 " (F41.1) Generalized Anxiety Disorder  Psychosocial / Contextual Factors:  mom being sick, dreams, being late  PROMIS (reviewed every 90 days): 11/4/2022      Referral / Collaboration:  Referral to another professional/service is not indicated at this time..    Anticipated number of session for this episode of care: 4  Anticipation frequency of session: Monthly  Anticipated Duration of each session: 38-52 minutes  Treatment plan will be reviewed in 90 days or when goals have been changed.       MeasurableTreatment Goal(s) related to diagnosis / functional impairment(s)  Goal 1: Patient will report absence of persistent anxiety mood and report of reduced frequency and intensity of worry and absence of persistent anxious mood to acceptable levels, no panic attacks, report subjective comfort with rumination or a period of 90 days. Within 6 months as clinically observed and by patient self-report    I will know I've met my goal when side effects go away.      Objective #A (Patient Action)    Patient will demonstrate and report a level of anxiety that can be managed at a lower level of care.  Absence of persistent anxious mood and report of reduced frequency and intensity of worry and absence of persistent anxious mood to acceptable levels, no panic attacks, report subjective comfort with rumination for a period of 90 days, within 6 months as clinically observed and by patient self-report.  Status: continued - Date:9/26/2022    Intervention(s)  Therapist will provide individual therapy to identify triggers to anxiety, gain feedback on helpful coping tools and thought-reframing techniques, and identify preferred way of being. Tx to include discuss current stressors and interpersonal conflicts and how to cope with these, coaching, diagnostic testing, referral for medication as indicated, use prescribed medication, cognitive restructuring, interpersonal, family therapy, supportive therapy services.        Patient has  reviewed and agreed to the above plan.      Zelda Salazar, [facility]  9/26/2022

## 2022-12-27 ENCOUNTER — VIRTUAL VISIT (OUTPATIENT)
Dept: PSYCHOLOGY | Facility: CLINIC | Age: 47
End: 2022-12-27
Payer: COMMERCIAL

## 2022-12-27 DIAGNOSIS — F41.1 GENERALIZED ANXIETY DISORDER: Primary | ICD-10-CM

## 2022-12-27 PROCEDURE — 90834 PSYTX W PT 45 MINUTES: CPT | Mod: 95 | Performed by: SOCIAL WORKER

## 2022-12-27 NOTE — PROGRESS NOTES
[facility]                                     Progress Note    Patient Name: Dannie Hart  Date: 12/27/2022         Service Type: Individual      Session Start Time: 1102  Session End Time: 1142     Session Length: 38-52    Session #: 8    Attendees: Client    Service Modality:  Video Visit:      Provider verified identity through the following two step process.  Patient provided:  Patient is known previously to provider    Telemedicine Visit: The patient's condition can be safely assessed and treated via synchronous audio and visual telemedicine encounter.      Reason for Telemedicine Visit: Patient has requested telehealth visit    Originating Site (Patient Location): Patient's home    Distant Site (Provider Location): Provider Remote Setting- Home Office    Consent:  The patient/guardian has verbally consented to: the potential risks and benefits of telemedicine (video visit) versus in person care; bill my insurance or make self-payment for services provided; and responsibility for payment of non-covered services.     Patient would like the video invitation sent by:  Send to e-mail at: [email or web address]    Mode of Communication:  Video Conference via Amwell    As the provider I attest to compliance with applicable laws and regulations related to telemedicine.    DATA  Interactive Complexity: No  Crisis: No        Progress Since Last Session (Related to Symptoms / Goals / Homework):   Symptoms: No change grief and loss    Homework: Achieved / completed to satisfaction      Episode of Care Goals: Minimal progress - PREPARATION (Decided to change - considering how); Intervened by negotiating a change plan and determining options / strategies for behavior change, identifying triggers, exploring social supports, and working towards setting a date to begin behavior change     Current / Ongoing Stressors and Concerns:    mom passing, dreams, being late     Treatment Objective(s)  Addressed in This Session:     Therapist will provide individual therapy to identify triggers to anxiety, gain feedback on helpful coping tools and thought-reframing techniques, and identify preferred way of being. Tx to include discuss current stressors and interpersonal conflicts and how to cope with these, coaching, diagnostic testing, referral for medication as indicated, use prescribed medication, cognitive restructuring, interpersonal, family therapy, supportive therapy services.     Intervention:   Therapist met with patient to review goals and interventions. Therapist utilized reflected listening as patient gave brief reflection of week. Patient reported difficulty with the holidays and processed. Therapist supported patient as he processed and provided education with what the steps can look like and allowance . Therapist suggested patient be patient with himself in the process and for patient to add into his days a healthy coping skill such as running or walking as a release.   Patient presented with an anxious affect. Patient was engaged in session and open to feedback. Patient reported no safety concerns.     Assessments completed prior to visit:  The following assessments were completed by patient for this visit:  PHQ9:   PHQ-9 SCORE 9/10/2020 9/15/2020 10/5/2020 6/4/2021 6/8/2022 9/26/2022 11/4/2022   PHQ-9 Total Score MyChart - - - - 6 (Mild depression) 9 (Mild depression) 8 (Mild depression)   PHQ-9 Total Score 4 6 6 1 6 9 8     GAD2:   EVELYN-2 6/8/2022 7/11/2022 8/14/2022 9/26/2022 11/29/2022 12/27/2022   Feeling nervous, anxious, or on edge 1 1 2 1 2 1   Not being able to stop or control worrying 0 1 2 1 2 1   EVELYN-2 Total Score 1 2 4 2 4 2          ASSESSMENT: Current Emotional / Mental Status (status of significant symptoms):   Risk status (Self / Other harm or suicidal ideation)   Patient denies current fears or concerns for personal safety.   Patient denies current or recent suicidal ideation or  "behaviors.   Patient denies current or recent homicidal ideation or behaviors.   Patient denies current or recent self injurious behavior or ideation.   Patient denies other safety concerns.   Patient reports there has been no change in risk factors since their last session.     Patient reports there has been no change in protective factors since their last session.     Recommended that patient call 911 or go to the local ED should there be a change in any of these risk factors.     Appearance:   Appropriate    Eye Contact:   Fair    Psychomotor Behavior: Restless    Attitude:   Cooperative    Orientation:   All   Speech    Rate / Production: Emotional Talkative    Volume:  Normal    Mood:    Anxious  Sad  Grieving   Affect:    Tearful Worrisome    Thought Content:  Clear    Thought Form:  Coherent    Insight:    Fair      Medication Review:   Changes to psychiatric medications, see updated Medication List in EPIC.      Medication Compliance:   NA     Changes in Health Issues:   None reported     Chemical Use Review:   Substance Use: Chemical use reviewed, no active concerns identified      Tobacco Use: No current tobacco use.      Diagnosis:  1. Generalized anxiety disorder        Collateral Reports Completed:   Not Applicable    PLAN: (Patient Tasks / Therapist Tasks / Other)  Continue to read \"Anxiety the missing stage of grief\"   add into his days a healthy coping skill such as running or walking as a release.       Zelda Salazar, [facility]  12/27/2022    ______________________________________________________________________    Individual Treatment Plan    Patient's Name: Dannie Hart  YOB: 1975    Date of Creation: 6/8/2022  Date Treatment Plan Last Reviewed/Revised: 12/27/2022 due 3/26/2023    DSM5 Diagnoses: 300.02 (F41.1) Generalized Anxiety Disorder  Psychosocial / Contextual Factors:  mom being sick, dreams, being late  PROMIS (reviewed every 90 days): 11/4/2022      Referral / " Collaboration:  Referral to another professional/service is not indicated at this time..    Anticipated number of session for this episode of care: 4  Anticipation frequency of session: Monthly  Anticipated Duration of each session: 38-52 minutes  Treatment plan will be reviewed in 90 days or when goals have been changed.       MeasurableTreatment Goal(s) related to diagnosis / functional impairment(s)  Goal 1: Patient will report absence of persistent anxiety mood and report of reduced frequency and intensity of worry and absence of persistent anxious mood to acceptable levels, no panic attacks, report subjective comfort with rumination or a period of 90 days. Within 6 months as clinically observed and by patient self-report    I will know I've met my goal when side effects go away.      Objective #A (Patient Action)    Patient will demonstrate and report a level of anxiety that can be managed at a lower level of care.  Absence of persistent anxious mood and report of reduced frequency and intensity of worry and absence of persistent anxious mood to acceptable levels, no panic attacks, report subjective comfort with rumination for a period of 90 days, within 6 months as clinically observed and by patient self-report.  Status: continued - Date: 12/27/2022    Intervention(s)  Therapist will provide individual therapy to identify triggers to anxiety, gain feedback on helpful coping tools and thought-reframing techniques, and identify preferred way of being. Tx to include discuss current stressors and interpersonal conflicts and how to cope with these, coaching, diagnostic testing, referral for medication as indicated, use prescribed medication, cognitive restructuring, interpersonal, family therapy, supportive therapy services.        Patient has reviewed and agreed to the above plan.      Zelda Salazar, [facility]  12/27/2022

## 2023-01-02 DIAGNOSIS — F41.9 ANXIETY: ICD-10-CM

## 2023-04-17 ENCOUNTER — TELEPHONE (OUTPATIENT)
Dept: FAMILY MEDICINE | Facility: CLINIC | Age: 48
End: 2023-04-17
Payer: COMMERCIAL

## 2023-04-17 DIAGNOSIS — G47.00 INSOMNIA, UNSPECIFIED TYPE: Primary | ICD-10-CM

## 2023-04-17 NOTE — TELEPHONE ENCOUNTER
Order/Referral Request    Who is requesting: Patient     Orders being requested: Sleep Study referral     Reason service is needed/diagnosis: Previosu referral .       Has this been discussed with Provider: Yes In  a referral was given by PCP.     Does patient have a preference on a Group/Provider/Facility? [facility].     Does patient have an appointment scheduled?: No    Where to send orders: N/A    Could we send this information to you in [facility] or would you prefer to receive a phone call?:   No preference   Okay to leave a detailed message?: Yes at Home number on file [phone] (home)

## 2023-04-19 NOTE — TELEPHONE ENCOUNTER
Copy Placed at  for  per patients request.     Joyce Correa -    [facility]     Alert and oriented to person, place and time

## 2023-08-28 ENCOUNTER — PATIENT OUTREACH (OUTPATIENT)
Dept: CARE COORDINATION | Facility: CLINIC | Age: 48
End: 2023-08-28
Payer: COMMERCIAL

## 2023-09-06 ENCOUNTER — MYC MEDICAL ADVICE (OUTPATIENT)
Dept: FAMILY MEDICINE | Facility: CLINIC | Age: 48
End: 2023-09-06
Payer: COMMERCIAL

## 2023-09-06 DIAGNOSIS — F41.9 ANXIETY: ICD-10-CM

## 2023-09-11 ENCOUNTER — PATIENT OUTREACH (OUTPATIENT)
Dept: CARE COORDINATION | Facility: CLINIC | Age: 48
End: 2023-09-11
Payer: COMMERCIAL

## 2023-10-12 ASSESSMENT — ENCOUNTER SYMPTOMS
PALPITATIONS: 0
WEAKNESS: 0
SORE THROAT: 0
ABDOMINAL PAIN: 0
CHILLS: 0
EYE PAIN: 0
NAUSEA: 0
HEMATOCHEZIA: 0
HEMATURIA: 0
DIARRHEA: 0
NERVOUS/ANXIOUS: 1
PARESTHESIAS: 0
FEVER: 0
HEADACHES: 1
COUGH: 0
JOINT SWELLING: 0
FREQUENCY: 1
ARTHRALGIAS: 0
CONSTIPATION: 0
DIZZINESS: 0
MYALGIAS: 0
DYSURIA: 0
HEARTBURN: 0
SHORTNESS OF BREATH: 0

## 2023-10-17 ASSESSMENT — PATIENT HEALTH QUESTIONNAIRE - PHQ9
SUM OF ALL RESPONSES TO PHQ QUESTIONS 1-9: 6
SUM OF ALL RESPONSES TO PHQ QUESTIONS 1-9: 6
10. IF YOU CHECKED OFF ANY PROBLEMS, HOW DIFFICULT HAVE THESE PROBLEMS MADE IT FOR YOU TO DO YOUR WORK, TAKE CARE OF THINGS AT HOME, OR GET ALONG WITH OTHER PEOPLE: SOMEWHAT DIFFICULT

## 2023-10-18 ENCOUNTER — OFFICE VISIT (OUTPATIENT)
Dept: FAMILY MEDICINE | Facility: CLINIC | Age: 48
End: 2023-10-18
Payer: COMMERCIAL

## 2023-10-18 VITALS
WEIGHT: 205 LBS | DIASTOLIC BLOOD PRESSURE: 88 MMHG | SYSTOLIC BLOOD PRESSURE: 127 MMHG | RESPIRATION RATE: 20 BRPM | BODY MASS INDEX: 28.7 KG/M2 | HEIGHT: 71 IN | TEMPERATURE: 97.7 F | HEART RATE: 81 BPM | OXYGEN SATURATION: 95 %

## 2023-10-18 DIAGNOSIS — Z13.6 CARDIOVASCULAR SCREENING; LDL GOAL LESS THAN 100: ICD-10-CM

## 2023-10-18 DIAGNOSIS — Z13.1 SCREENING FOR DIABETES MELLITUS: ICD-10-CM

## 2023-10-18 DIAGNOSIS — F41.9 ANXIETY: ICD-10-CM

## 2023-10-18 DIAGNOSIS — Z00.00 ROUTINE GENERAL MEDICAL EXAMINATION AT A HEALTH CARE FACILITY: Primary | ICD-10-CM

## 2023-10-18 DIAGNOSIS — Z12.5 SCREENING FOR PROSTATE CANCER: ICD-10-CM

## 2023-10-18 DIAGNOSIS — Z23 ENCOUNTER FOR IMMUNIZATION: ICD-10-CM

## 2023-10-18 DIAGNOSIS — Z86.0100 HISTORY OF COLONIC POLYPS: ICD-10-CM

## 2023-10-18 LAB
CHOLEST SERPL-MCNC: 167 MG/DL
HBA1C MFR BLD: 5.5 % (ref 0–5.6)
HDLC SERPL-MCNC: 39 MG/DL
LDLC SERPL CALC-MCNC: 106 MG/DL
NONHDLC SERPL-MCNC: 128 MG/DL
PSA SERPL DL<=0.01 NG/ML-MCNC: 3.75 NG/ML (ref 0–2.5)
TRIGL SERPL-MCNC: 108 MG/DL

## 2023-10-18 PROCEDURE — 99396 PREV VISIT EST AGE 40-64: CPT | Mod: 25 | Performed by: FAMILY MEDICINE

## 2023-10-18 PROCEDURE — 96127 BRIEF EMOTIONAL/BEHAV ASSMT: CPT | Performed by: FAMILY MEDICINE

## 2023-10-18 PROCEDURE — 36415 COLL VENOUS BLD VENIPUNCTURE: CPT | Performed by: FAMILY MEDICINE

## 2023-10-18 PROCEDURE — 90715 TDAP VACCINE 7 YRS/> IM: CPT | Performed by: FAMILY MEDICINE

## 2023-10-18 PROCEDURE — 83036 HEMOGLOBIN GLYCOSYLATED A1C: CPT | Performed by: FAMILY MEDICINE

## 2023-10-18 PROCEDURE — G0103 PSA SCREENING: HCPCS | Performed by: FAMILY MEDICINE

## 2023-10-18 PROCEDURE — 80061 LIPID PANEL: CPT | Performed by: FAMILY MEDICINE

## 2023-10-18 PROCEDURE — 90471 IMMUNIZATION ADMIN: CPT | Performed by: FAMILY MEDICINE

## 2023-10-18 RX ORDER — HYDROXYZINE HYDROCHLORIDE 25 MG/1
25 TABLET, FILM COATED ORAL EVERY 8 HOURS PRN
Qty: 20 TABLET | Refills: 0 | Status: SHIPPED | OUTPATIENT
Start: 2023-10-18

## 2023-10-18 ASSESSMENT — ENCOUNTER SYMPTOMS
ABDOMINAL PAIN: 0
HEMATOCHEZIA: 0
DIZZINESS: 0
SORE THROAT: 0
NAUSEA: 0
SHORTNESS OF BREATH: 0
PALPITATIONS: 0
CONSTIPATION: 0
MYALGIAS: 0
JOINT SWELLING: 0
FREQUENCY: 1
COUGH: 0
PARESTHESIAS: 0
DYSURIA: 0
DIARRHEA: 0
HEMATURIA: 0
WEAKNESS: 0
FEVER: 0
EYE PAIN: 0
ARTHRALGIAS: 0
HEARTBURN: 0
CHILLS: 0
HEADACHES: 1
NERVOUS/ANXIOUS: 1

## 2023-10-18 ASSESSMENT — PAIN SCALES - GENERAL: PAINLEVEL: NO PAIN (0)

## 2023-10-18 NOTE — NURSING NOTE
Prior to immunization administration, verified patients identity using patient s name and date of birth. Please see Immunization Activity for additional information.     Screening Questionnaire for Adult Immunization    Are you sick today?   No   Do you have allergies to medications, food, a vaccine component or latex?   Yes   Have you ever had a serious reaction after receiving a vaccination?   No   Do you have a long-term health problem with heart, lung, kidney, or metabolic disease (e.g., diabetes), asthma, a blood disorder, no spleen, complement component deficiency, a cochlear implant, or a spinal fluid leak?  Are you on long-term aspirin therapy?   No   Do you have cancer, leukemia, HIV/AIDS, or any other immune system problem?   No   Do you have a parent, brother, or sister with an immune system problem?   No   In the past 3 months, have you taken medications that affect  your immune system, such as prednisone, other steroids, or anticancer drugs; drugs for the treatment of rheumatoid arthritis, Crohn s disease, or psoriasis; or have you had radiation treatments?   No   Have you had a seizure, or a brain or other nervous system problem?   No   During the past year, have you received a transfusion of blood or blood    products, or been given immune (gamma) globulin or antiviral drug?   No   For women: Are you pregnant or is there a chance you could become       pregnant during the next month?   No   Have you received any vaccinations in the past 4 weeks?   No     Immunization questionnaire was positive for at least one answer.  Notified Dr. Ramirez.      Patient instructed to remain in clinic for 15 minutes afterwards, and to report any adverse reactions.     Screening performed by Anne-Marie Chi CMA on 10/18/2023 at 11:37 AM.        
No

## 2023-10-18 NOTE — PROGRESS NOTES
SUBJECTIVE:   CC: Dannie is an 47 year old who presents for preventative health visit.       10/18/2023    10:57 AM   Additional Questions   Roomed by Cynthia López       Healthy Habits:     Getting at least 3 servings of Calcium per day:  Yes    Bi-annual eye exam:  Yes    Dental care twice a year:  Yes    Sleep apnea or symptoms of sleep apnea:  Sleep apnea    Diet:  Regular (no restrictions)    Frequency of exercise:  1 day/week    Duration of exercise:  15-30 minutes    Taking medications regularly:  Yes    Barriers to taking medications:  None    Medication side effects:  None      Today's PHQ-9 Score:       10/17/2023     2:54 PM   PHQ-9 SCORE   PHQ-9 Total Score MyChart 6 (Mild depression)   PHQ-9 Total Score 6                        Social History     Tobacco Use    Smoking status: Never    Smokeless tobacco: Never   Substance Use Topics    Alcohol use: Yes     Comment: socially             10/12/2023     8:15 AM   Alcohol Use   Prescreen: >3 drinks/day or >7 drinks/week? No          No data to display                Last PSA:   PSA   Date Value Ref Range Status   06/04/2021 3.89 0 - 4 ug/L Final     Comment:     Assay Method:  Chemiluminescence using Siemens Vista analyzer     Prostate Specific Antigen Screen   Date Value Ref Range Status   09/26/2022 3.26 0.00 - 4.00 ug/L Final       Reviewed orders with patient. Reviewed health maintenance and updated orders accordingly - Yes       Reviewed and updated as needed this visit by clinical staff   Tobacco  Allergies  Meds              Reviewed and updated as needed this visit by Provider                     Review of Systems   Constitutional:  Negative for chills and fever.   HENT:  Positive for congestion. Negative for ear pain, hearing loss and sore throat.    Eyes:  Negative for pain and visual disturbance.   Respiratory:  Negative for cough and shortness of breath.    Cardiovascular:  Negative for chest pain, palpitations and peripheral edema.  "  Gastrointestinal:  Negative for abdominal pain, constipation, diarrhea, heartburn, hematochezia and nausea.   Genitourinary:  Positive for frequency. Negative for dysuria, genital sores, hematuria, impotence, penile discharge and urgency.   Musculoskeletal:  Negative for arthralgias, joint swelling and myalgias.   Skin:  Negative for rash.   Neurological:  Positive for headaches. Negative for dizziness, weakness and paresthesias.   Psychiatric/Behavioral:  Positive for mood changes. The patient is nervous/anxious.      Switched pillows, helped headaches    Sertraline helps a lot    Once at night to urinate    Drinks a lot of water during day  Urinate frequently    No pain / burning    Head congestion/ fall allergies    Sudafed works fine    Not much exercise right now    Just finished coaching football    Coaches baseball    Using cpap now, sleeping better    Energy level better    Had vasectomy, wife with IUD    No std concern    OBJECTIVE:   /88 (BP Location: Right arm, Patient Position: Chair, Cuff Size: Adult Regular)   Pulse 81   Temp 97.7  F (36.5  C) (Temporal)   Resp 20   Ht 1.81 m (5' 11.26\")   Wt 93 kg (205 lb)   SpO2 95%   BMI 28.38 kg/m      Physical Exam  GENERAL: healthy, alert and no distress  EYES: Eyes grossly normal to inspection, PERRL and conjunctivae and sclerae normal  HENT: ear canals and TM's normal, nose and mouth without ulcers or lesions  NECK: no adenopathy, no asymmetry, masses, or scars and thyroid normal to palpation  RESP: lungs clear to auscultation - no rales, rhonchi or wheezes  CV: regular rate and rhythm, normal S1 S2, no S3 or S4, no murmur, click or rub, no peripheral edema and peripheral pulses strong  ABDOMEN: soft, nontender, no hepatosplenomegaly, no masses and bowel sounds normal  MS: no gross musculoskeletal defects noted, no edema  SKIN: no suspicious lesions or rashes  NEURO: Normal strength and tone, mentation intact and speech normal  PSYCH: mentation " "appears normal, affect normal/bright    Diagnostic Test Results:  Labs reviewed in Epic    ASSESSMENT/PLAN:   Dannie was seen today for physical.    Diagnoses and all orders for this visit:    Routine general medical examination at a health care facility    Encounter for immunization  -     TDAP 10-64Y (ADACEL,BOOSTRIX)  -     SCREENING QUESTIONS FOR ADULT IMMUNIZATIONS    Anxiety  -     sertraline (ZOLOFT) 50 MG tablet; Take 1 tablet (50 mg) by mouth daily  -     hydrOXYzine (ATARAX) 25 MG tablet; Take 1 tablet (25 mg) by mouth every 8 hours as needed for anxiety    History of colonic polyps  -     Adult GI  Referral - Procedure Only; Future    Screening for diabetes mellitus  -     Hemoglobin A1c; Future  -     Hemoglobin A1c    CARDIOVASCULAR SCREENING; LDL GOAL LESS THAN 100  -     Lipid panel reflex to direct LDL Non-fasting; Future  -     Lipid panel reflex to direct LDL Non-fasting    Screening for prostate cancer  -     Prostate Specific Antigen Screen; Future  -     Prostate Specific Antigen Screen    Other orders  -     PRIMARY CARE FOLLOW-UP SCHEDULING; Future    Overall patient stable  Keep working on healthy diet/exercise   Refill setraline, patient wants to stay on same dose  Check a few labs  Tdap given  No std concern  He sees dermatology yearly  Patient can schedule colonoscopy    Patient has been advised of split billing requirements and indicates understanding: Yes      COUNSELING:   Reviewed preventive health counseling, as reflected in patient instructions       Regular exercise       Healthy diet/nutrition       Vision screening       Family planning       Safe sex practices/STD prevention       Colorectal cancer screening       Prostate cancer screening      BMI:   Estimated body mass index is 28.38 kg/m  as calculated from the following:    Height as of this encounter: 1.81 m (5' 11.26\").    Weight as of this encounter: 93 kg (205 lb).   Weight management plan: Discussed healthy diet " and exercise guidelines      He reports that he has never smoked. He has never used smokeless tobacco.            Adeel Ramirez MD  [facility] [place]  Answers submitted by the patient for this visit:  Patient Health Questionnaire (Submitted on 10/17/2023)  If you checked off any problems, how difficult have these problems made it for you to do your work, take care of things at home, or get along with other people?: Somewhat difficult  PHQ9 TOTAL SCORE: 6

## 2023-10-18 NOTE — PATIENT INSTRUCTIONS
Keep working on healthy diet/exercise     We will send you lab results    Stay on same medication    Call / return to clinic with problems/ questions            Preventive Health Recommendations  Male Ages 40 to 49    Yearly exam:             See your health care provider every year in order to  o   Review health changes.   o   Discuss preventive care.    o   Review your medicines if your doctor has prescribed any.  You should be tested each year for STDs (sexually transmitted diseases) if you re at risk.   Have a cholesterol test every 5 years.   Have a colonoscopy (test for colon cancer) beginning at age 45.  Ask your provider about other options like a yearly FIT test or Cologuard test every 3 years (stool tests)   After age 45, have a diabetes test (fasting glucose). If you are at risk for diabetes, you should have this test every 3 years.    Talk with your health care provider about whether or not a prostate cancer screening test (PSA) is right for you.    Shots: Get a flu shot each year. Get a tetanus shot every 10 years.     Nutrition:  Eat at least 5 servings of fruits and vegetables daily.   Eat whole-grain bread, whole-wheat pasta and brown rice instead of white grains and rice.   Get adequate Calcium and Vitamin D.     Lifestyle  Exercise for at least 150 minutes a week (30 minutes a day, 5 days a week). This will help you control your weight and prevent disease.   Limit alcohol to one drink per day.   No smoking.   Wear sunscreen to prevent skin cancer.   See your dentist every six months for an exam and cleaning.

## 2023-10-19 NOTE — RESULT ENCOUNTER NOTE
"The prostate blood test is marked as high.  However, they just recently changed normal range.  Normal used to be up to 4.    I will call in the next few days to discuss in more detail.    LDL \"bad\" cholesterol slightly high.    Normal hemoglobin a1c means non diabetes or prediabetes.    Adeel Ramirez MD  "

## 2023-10-24 ENCOUNTER — TELEPHONE (OUTPATIENT)
Dept: FAMILY MEDICINE | Facility: CLINIC | Age: 48
End: 2023-10-24
Payer: COMMERCIAL

## 2023-10-24 DIAGNOSIS — Z12.5 SCREENING FOR PROSTATE CANCER: Primary | ICD-10-CM

## 2023-11-06 NOTE — RESULT ENCOUNTER NOTE
Rohith Pandey     Just wanted to follow up with you about the prostate blood test ( I tried to call a few times )    Up until a few months ago your psa value would be normal.  They recently changed normal ranges based on age, since psa gradually increases for all men as we get older    I advise we recheck this in 4-6 months    At that time do a lab only appointment ( need not be fasting ) and I will send you result.  If it is about the same, okay to just monitor and check again next year.      Take care.    Adeel Ramirez MD

## 2023-11-30 ENCOUNTER — TRANSFERRED RECORDS (OUTPATIENT)
Dept: HEALTH INFORMATION MANAGEMENT | Facility: CLINIC | Age: 48
End: 2023-11-30
Payer: COMMERCIAL

## 2023-12-18 ENCOUNTER — PATIENT OUTREACH (OUTPATIENT)
Dept: GASTROENTEROLOGY | Facility: CLINIC | Age: 48
End: 2023-12-18
Payer: COMMERCIAL

## 2024-01-15 ENCOUNTER — OFFICE VISIT (OUTPATIENT)
Dept: FAMILY MEDICINE | Facility: CLINIC | Age: 49
End: 2024-01-15
Payer: COMMERCIAL

## 2024-01-15 VITALS
HEIGHT: 71 IN | SYSTOLIC BLOOD PRESSURE: 134 MMHG | OXYGEN SATURATION: 98 % | HEART RATE: 66 BPM | TEMPERATURE: 97.6 F | RESPIRATION RATE: 18 BRPM | BODY MASS INDEX: 29.71 KG/M2 | DIASTOLIC BLOOD PRESSURE: 79 MMHG | WEIGHT: 212.25 LBS

## 2024-01-15 DIAGNOSIS — R10.30 LOWER ABDOMINAL PAIN: ICD-10-CM

## 2024-01-15 DIAGNOSIS — R97.20 ELEVATED PROSTATE SPECIFIC ANTIGEN (PSA): Primary | ICD-10-CM

## 2024-01-15 DIAGNOSIS — R35.0 URINARY FREQUENCY: ICD-10-CM

## 2024-01-15 LAB
ALBUMIN SERPL BCG-MCNC: 4.1 G/DL (ref 3.5–5.2)
ALBUMIN UR-MCNC: NEGATIVE MG/DL
ALP SERPL-CCNC: 94 U/L (ref 40–150)
ALT SERPL W P-5'-P-CCNC: 28 U/L (ref 0–70)
ANION GAP SERPL CALCULATED.3IONS-SCNC: 7 MMOL/L (ref 7–15)
APPEARANCE UR: CLEAR
AST SERPL W P-5'-P-CCNC: 33 U/L (ref 0–45)
BASOPHILS # BLD AUTO: 0.1 10E3/UL (ref 0–0.2)
BASOPHILS NFR BLD AUTO: 1 %
BILIRUB SERPL-MCNC: 0.5 MG/DL
BILIRUB UR QL STRIP: NEGATIVE
BUN SERPL-MCNC: 17.8 MG/DL (ref 6–20)
CALCIUM SERPL-MCNC: 9.2 MG/DL (ref 8.6–10)
CHLORIDE SERPL-SCNC: 107 MMOL/L (ref 98–107)
COLOR UR AUTO: YELLOW
CREAT SERPL-MCNC: 0.92 MG/DL (ref 0.67–1.17)
DEPRECATED HCO3 PLAS-SCNC: 28 MMOL/L (ref 22–29)
EGFRCR SERPLBLD CKD-EPI 2021: >90 ML/MIN/1.73M2
EOSINOPHIL # BLD AUTO: 0.3 10E3/UL (ref 0–0.7)
EOSINOPHIL NFR BLD AUTO: 4 %
ERYTHROCYTE [DISTWIDTH] IN BLOOD BY AUTOMATED COUNT: 12.4 % (ref 10–15)
GLUCOSE SERPL-MCNC: 102 MG/DL (ref 70–99)
GLUCOSE UR STRIP-MCNC: NEGATIVE MG/DL
HBA1C MFR BLD: 5.4 % (ref 0–5.6)
HCT VFR BLD AUTO: 46.2 % (ref 40–53)
HGB BLD-MCNC: 15.4 G/DL (ref 13.3–17.7)
HGB UR QL STRIP: NEGATIVE
IMM GRANULOCYTES # BLD: 0 10E3/UL
IMM GRANULOCYTES NFR BLD: 0 %
KETONES UR STRIP-MCNC: NEGATIVE MG/DL
LEUKOCYTE ESTERASE UR QL STRIP: NEGATIVE
LYMPHOCYTES # BLD AUTO: 2 10E3/UL (ref 0.8–5.3)
LYMPHOCYTES NFR BLD AUTO: 28 %
MCH RBC QN AUTO: 29.5 PG (ref 26.5–33)
MCHC RBC AUTO-ENTMCNC: 33.3 G/DL (ref 31.5–36.5)
MCV RBC AUTO: 89 FL (ref 78–100)
MONOCYTES # BLD AUTO: 0.6 10E3/UL (ref 0–1.3)
MONOCYTES NFR BLD AUTO: 8 %
NEUTROPHILS # BLD AUTO: 4.2 10E3/UL (ref 1.6–8.3)
NEUTROPHILS NFR BLD AUTO: 60 %
NITRATE UR QL: NEGATIVE
PH UR STRIP: 5.5 [PH] (ref 5–7)
PLATELET # BLD AUTO: 250 10E3/UL (ref 150–450)
POTASSIUM SERPL-SCNC: 4.4 MMOL/L (ref 3.4–5.3)
PROT SERPL-MCNC: 7 G/DL (ref 6.4–8.3)
PSA SERPL DL<=0.01 NG/ML-MCNC: 2.77 NG/ML (ref 0–2.5)
RBC # BLD AUTO: 5.22 10E6/UL (ref 4.4–5.9)
RBC #/AREA URNS AUTO: ABNORMAL /HPF
SODIUM SERPL-SCNC: 142 MMOL/L (ref 135–145)
SP GR UR STRIP: >=1.03 (ref 1–1.03)
SQUAMOUS #/AREA URNS AUTO: ABNORMAL /LPF
UROBILINOGEN UR STRIP-ACNC: 0.2 E.U./DL
WBC # BLD AUTO: 7.1 10E3/UL (ref 4–11)
WBC #/AREA URNS AUTO: ABNORMAL /HPF

## 2024-01-15 PROCEDURE — 81001 URINALYSIS AUTO W/SCOPE: CPT | Performed by: FAMILY MEDICINE

## 2024-01-15 PROCEDURE — 80053 COMPREHEN METABOLIC PANEL: CPT | Performed by: FAMILY MEDICINE

## 2024-01-15 PROCEDURE — 36415 COLL VENOUS BLD VENIPUNCTURE: CPT | Performed by: FAMILY MEDICINE

## 2024-01-15 PROCEDURE — 85025 COMPLETE CBC W/AUTO DIFF WBC: CPT | Performed by: FAMILY MEDICINE

## 2024-01-15 PROCEDURE — 99213 OFFICE O/P EST LOW 20 MIN: CPT | Performed by: FAMILY MEDICINE

## 2024-01-15 PROCEDURE — 83036 HEMOGLOBIN GLYCOSYLATED A1C: CPT | Performed by: FAMILY MEDICINE

## 2024-01-15 PROCEDURE — G0103 PSA SCREENING: HCPCS | Performed by: FAMILY MEDICINE

## 2024-01-15 ASSESSMENT — PATIENT HEALTH QUESTIONNAIRE - PHQ9
SUM OF ALL RESPONSES TO PHQ QUESTIONS 1-9: 1
10. IF YOU CHECKED OFF ANY PROBLEMS, HOW DIFFICULT HAVE THESE PROBLEMS MADE IT FOR YOU TO DO YOUR WORK, TAKE CARE OF THINGS AT HOME, OR GET ALONG WITH OTHER PEOPLE: NOT DIFFICULT AT ALL
SUM OF ALL RESPONSES TO PHQ QUESTIONS 1-9: 1

## 2024-01-15 ASSESSMENT — PAIN SCALES - GENERAL: PAINLEVEL: MILD PAIN (2)

## 2024-01-15 NOTE — PATIENT INSTRUCTIONS
Stay well hydrated    We will send you lab results    Advise urology consult if symptoms not resolving    Be seen promptly if symptoms acutely worsen

## 2024-01-15 NOTE — PROGRESS NOTES
"Theron Pandey is a 48 year old, presenting for the following health issues:  Patient Request        1/15/2024    10:05 AM   Additional Questions   Roomed by Cynthia López       History of Present Illness       Reason for visit:  Lower stomach pain  Symptom onset:  1-3 days ago  Symptoms include:  Stomach ache, pain going to the bathroom, frequent urination  Symptom intensity:  Moderate  Symptom progression:  Staying the same  Had these symptoms before:  No  What makes it better:  Ibuprofin    He eats 2-3 servings of fruits and vegetables daily.He consumes 2 sweetened beverage(s) daily.He exercises with enough effort to increase his heart rate 30 to 60 minutes per day.  He exercises with enough effort to increase his heart rate 5 days per week.   He is taking medications regularly.             Review of Systems   Pain in lower abd started recently    Frequency and decreased pressure on urination the last month or so    Ibuprofen helps    No fever/ chills    No burning on urination    No std concern about std    No relation of pain to eating or bowels    Stools normal    Urine okay but darker than usual    Energy level normal    To gym 4-5 x per week          Objective    /79 (BP Location: Right arm, Patient Position: Chair, Cuff Size: Adult Regular)   Pulse 66   Temp 97.6  F (36.4  C) (Temporal)   Resp 18   Ht 1.81 m (5' 11.26\")   Wt 96.3 kg (212 lb 4 oz)   SpO2 98%   BMI 29.39 kg/m    Body mass index is 29.39 kg/m .  Physical Exam  Constitutional:       Appearance: He is well-developed.   HENT:      Head: Normocephalic and atraumatic.   Eyes:      Conjunctiva/sclera: Conjunctivae normal.   Neck:      Vascular: No carotid bruit.   Cardiovascular:      Rate and Rhythm: Normal rate and regular rhythm.      Heart sounds: Normal heart sounds.   Pulmonary:      Effort: Pulmonary effort is normal. No respiratory distress.      Breath sounds: Normal breath sounds.   Abdominal:      Tenderness: There " is no right CVA tenderness, left CVA tenderness, guarding or rebound.   Neurological:      Mental Status: He is alert and oriented to person, place, and time.      Cranial Nerves: No cranial nerve deficit.   Psychiatric:         Speech: Speech normal.         Behavior: Behavior normal.         Patient does have some tenderness in lower abdomen; not pinpoint  No peritoneal signs        Labs pending    ASSESSMENT / PLAN:  (R97.20) Elevated prostate specific antigen (PSA)  (primary encounter diagnosis)  Comment: recheck today  Plan: Prostate Specific Antigen Screen             (R10.30) Lower abdominal pain  Comment: check labs.  Possible prostatitis?  Possible early bph?  Plan: CBC with Platelets & Differential,         Comprehensive metabolic panel, UA with         Microscopic reflex to Culture - lab collect,         Adult Urology  Referral, UA         Microscopic with Reflex to Culture        Prudent to see urology given age, symptoms, duration, etc     (R35.0) Urinary frequency  Comment: as above   Plan: Hemoglobin A1c, Adult Urology          Referral           Be seen promptly if symptoms acutely worsen     I reviewed the patient's medications, allergies, medical history, family history, and social history.    Adeel Ramirez MD

## 2024-01-16 NOTE — RESULT ENCOUNTER NOTE
Good news that the PSA is lower than last time.    Other labs are fine.    Urine quite concentrated, so increase fluid intake.    See urology as we discussed.    Adeel Ramirez MD

## 2024-01-25 ENCOUNTER — OFFICE VISIT (OUTPATIENT)
Dept: UROLOGY | Facility: CLINIC | Age: 49
End: 2024-01-25
Attending: FAMILY MEDICINE
Payer: COMMERCIAL

## 2024-01-25 VITALS
BODY MASS INDEX: 29.68 KG/M2 | SYSTOLIC BLOOD PRESSURE: 108 MMHG | TEMPERATURE: 97.9 F | HEART RATE: 71 BPM | HEIGHT: 71 IN | DIASTOLIC BLOOD PRESSURE: 76 MMHG | WEIGHT: 212 LBS | OXYGEN SATURATION: 96 %

## 2024-01-25 DIAGNOSIS — N40.1 BENIGN PROSTATIC HYPERPLASIA WITH WEAK URINARY STREAM: ICD-10-CM

## 2024-01-25 DIAGNOSIS — R39.12 BENIGN PROSTATIC HYPERPLASIA WITH WEAK URINARY STREAM: ICD-10-CM

## 2024-01-25 PROCEDURE — 51798 US URINE CAPACITY MEASURE: CPT | Performed by: STUDENT IN AN ORGANIZED HEALTH CARE EDUCATION/TRAINING PROGRAM

## 2024-01-25 PROCEDURE — 99204 OFFICE O/P NEW MOD 45 MIN: CPT | Mod: 25 | Performed by: STUDENT IN AN ORGANIZED HEALTH CARE EDUCATION/TRAINING PROGRAM

## 2024-01-25 RX ORDER — ALFUZOSIN HYDROCHLORIDE 10 MG/1
10 TABLET, EXTENDED RELEASE ORAL EVERY EVENING
Qty: 90 TABLET | Refills: 1 | Status: SHIPPED | OUTPATIENT
Start: 2024-01-25

## 2024-01-25 ASSESSMENT — PAIN SCALES - GENERAL: PAINLEVEL: NO PAIN (0)

## 2024-01-25 NOTE — PROGRESS NOTES
"        Chief Complaint:   LUTS         History of Present Illness:   Dannie Hart is a 48 year old male with a history of major depressive disorder who presents for evaluation of LUTS.     The patient reports a 2-3 month history of slow stream, urinary frequency, and nocturia x 1-2. He occasionally notes sensation of incomplete bladder emptying. He noted some lower abdominal discomfort with urination, though this has improved. He denies gross hematuria.     He reports regular bowel movements.     He drinks one caffeinated beverage and water.     He denies a history of urologic surgeries.     His mother  of breast cancer. He thinks his mother's side has a history of prostate cancer. He has never a prostate biopsy.          Past Medical History:   No past medical history on file.         Past Surgical History:     Past Surgical History:   Procedure Laterality Date    APPENDECTOMY      HERNIA REPAIR      left inguinal    RELEASE CARPAL TUNNEL  2018    right wrist     VASECTOMY  2016            Medications     Current Outpatient Medications   Medication    hydrOXYzine (ATARAX) 25 MG tablet    sertraline (ZOLOFT) 50 MG tablet     No current facility-administered medications for this visit.            Allergies:   Hydrocodone-acetaminophen         Review of Systems:  From intake questionnaire   Negative 14 system review except as noted on HPI, nurse's note.         Physical Exam:   Patient is a 48 year old  male   Vitals: Blood pressure 108/76, pulse 71, temperature 97.9  F (36.6  C), temperature source Tympanic, height 1.81 m (5' 11.26\"), weight 96.2 kg (212 lb), SpO2 96%.  General Appearance Adult: Alert, no acute distress, oriented.  Lungs: Non-labored breathing.  Heart: No obvious jugular venous distension present.  Neuro: Alert, oriented, speech and mentation normal  : TAL anodular, symmetric, mild enlargement    PVR: 5 mL      Labs and Pathology:    I personally reviewed all applicable " laboratory data and went over findings with patient  Significant for:    CBC RESULTS:  Recent Labs   Lab Test 01/15/24  1042 09/26/22  0934 04/09/21  1527 11/12/19  0912   WBC 7.1 5.6 6.2 5.7   HGB 15.4 16.9 16.0 16.0    273 251 254        BMP RESULTS:  Recent Labs   Lab Test 01/15/24  1042 09/26/22  0934 06/04/21  0931 04/09/21  1527 09/04/20  0000 11/12/19  0912    138 142  --   --  141   POTASSIUM 4.4 4.1 4.1 3.9 4.2 4.2   CHLORIDE 107 106 110*  --   --  106   CO2 28 28 28  --   --  31   ANIONGAP 7 4 4  --   --  4   * 95 98  --  110* 86   BUN 17.8 20 18  --   --  17   CR 0.92 0.98 0.89 1.12 0.91 0.95   GFRESTIMATED >90 >90 >90 80 >60 >90   GFRESTBLACK  --   --  >90 >90 >60 >90   NUVIA 9.2 9.0 8.6  --   --  9.1       UA RESULTS:   Recent Labs   Lab Test 01/15/24  1042   SG >=1.030   URINEPH 5.5   NITRITE Negative   RBCU 0-2   WBCU 0-5       PSA RESULTS  PSA   Date Value Ref Range Status   06/04/2021 3.89 0 - 4 ug/L Final     Comment:     Assay Method:  Chemiluminescence using Siemens Vista analyzer   11/12/2019 3.30 0 - 4 ug/L Final     Comment:     Assay Method:  Chemiluminescence using Siemens Vista analyzer   09/11/2018 2.35 0 - 4 ug/L Final     Comment:     Assay Method:  Chemiluminescence using Siemens Vista analyzer     Prostate Specific Antigen Screen   Date Value Ref Range Status   01/15/2024 2.77 (H) 0.00 - 2.50 ng/mL Final   10/18/2023 3.75 (H) 0.00 - 2.50 ng/mL Final   09/26/2022 3.26 0.00 - 4.00 ug/L Final            Assessment and Plan:     Assessment: 48 year old male seen in evaluation for weak stream, urinary frequency, and nocturia x 1-2. His PVR today was 5 mL.     We discussed possible causes for his symptoms including BPH, prostatitis, and pelvic floor muscle dysfunction. His TAL today was unremarkable, making prostatitis low on the differential. We discussed treatment for BPH with alfuzosin. The patient is in agreement.     Plan:  Start alfuzosin 10 mg daily. Side effects  reviewed.   Follow up in 2-3 months, sooner if concerns.     FRANKIE STANTON PA-C  Department of Urology

## 2024-01-25 NOTE — NURSING NOTE
"Initial /76   Pulse 71   Temp 97.9  F (36.6  C) (Tympanic)   Ht 1.81 m (5' 11.26\")   Wt 96.2 kg (212 lb)   SpO2 96%   BMI 29.35 kg/m   Estimated body mass index is 29.35 kg/m  as calculated from the following:    Height as of this encounter: 1.81 m (5' 11.26\").    Weight as of this encounter: 96.2 kg (212 lb). .  Active order to obtain bladder scan? Yes   Name of ordering provider:  Viv Wilburn  Bladder scan preformed post void Yes.  Bladder scan reveled 5ML  Provider notified?  Yes    Cara HENRIQUEZ CMA        "

## 2024-09-18 ENCOUNTER — PATIENT OUTREACH (OUTPATIENT)
Dept: CARE COORDINATION | Facility: CLINIC | Age: 49
End: 2024-09-18
Payer: COMMERCIAL

## 2024-10-02 ENCOUNTER — MYC MEDICAL ADVICE (OUTPATIENT)
Dept: FAMILY MEDICINE | Facility: CLINIC | Age: 49
End: 2024-10-02
Payer: COMMERCIAL

## 2024-10-11 SDOH — HEALTH STABILITY: PHYSICAL HEALTH: ON AVERAGE, HOW MANY MINUTES DO YOU ENGAGE IN EXERCISE AT THIS LEVEL?: 50 MIN

## 2024-10-11 SDOH — HEALTH STABILITY: PHYSICAL HEALTH: ON AVERAGE, HOW MANY DAYS PER WEEK DO YOU ENGAGE IN MODERATE TO STRENUOUS EXERCISE (LIKE A BRISK WALK)?: 3 DAYS

## 2024-10-11 ASSESSMENT — SOCIAL DETERMINANTS OF HEALTH (SDOH): HOW OFTEN DO YOU GET TOGETHER WITH FRIENDS OR RELATIVES?: ONCE A WEEK

## 2024-10-15 ASSESSMENT — PATIENT HEALTH QUESTIONNAIRE - PHQ9
SUM OF ALL RESPONSES TO PHQ QUESTIONS 1-9: 4
10. IF YOU CHECKED OFF ANY PROBLEMS, HOW DIFFICULT HAVE THESE PROBLEMS MADE IT FOR YOU TO DO YOUR WORK, TAKE CARE OF THINGS AT HOME, OR GET ALONG WITH OTHER PEOPLE: SOMEWHAT DIFFICULT
SUM OF ALL RESPONSES TO PHQ QUESTIONS 1-9: 4

## 2024-10-16 ENCOUNTER — OFFICE VISIT (OUTPATIENT)
Dept: FAMILY MEDICINE | Facility: CLINIC | Age: 49
End: 2024-10-16
Payer: COMMERCIAL

## 2024-10-16 VITALS
DIASTOLIC BLOOD PRESSURE: 86 MMHG | HEIGHT: 72 IN | RESPIRATION RATE: 16 BRPM | OXYGEN SATURATION: 100 % | HEART RATE: 76 BPM | WEIGHT: 191 LBS | BODY MASS INDEX: 25.87 KG/M2 | SYSTOLIC BLOOD PRESSURE: 119 MMHG | TEMPERATURE: 97.2 F

## 2024-10-16 DIAGNOSIS — M25.511 RIGHT SHOULDER PAIN, UNSPECIFIED CHRONICITY: ICD-10-CM

## 2024-10-16 DIAGNOSIS — Z13.1 SCREENING FOR DIABETES MELLITUS: ICD-10-CM

## 2024-10-16 DIAGNOSIS — Z00.00 ROUTINE GENERAL MEDICAL EXAMINATION AT A HEALTH CARE FACILITY: Primary | ICD-10-CM

## 2024-10-16 DIAGNOSIS — F41.9 ANXIETY: ICD-10-CM

## 2024-10-16 DIAGNOSIS — E78.1 HIGH TRIGLYCERIDES: ICD-10-CM

## 2024-10-16 DIAGNOSIS — E78.5 HYPERLIPIDEMIA LDL GOAL <100: ICD-10-CM

## 2024-10-16 DIAGNOSIS — Z12.5 SCREENING FOR PROSTATE CANCER: ICD-10-CM

## 2024-10-16 LAB
ALBUMIN SERPL BCG-MCNC: 4.4 G/DL (ref 3.5–5.2)
ALP SERPL-CCNC: 82 U/L (ref 40–150)
ALT SERPL W P-5'-P-CCNC: 14 U/L (ref 0–70)
ANION GAP SERPL CALCULATED.3IONS-SCNC: 9 MMOL/L (ref 7–15)
AST SERPL W P-5'-P-CCNC: 20 U/L (ref 0–45)
BILIRUB SERPL-MCNC: 0.7 MG/DL
BUN SERPL-MCNC: 14.5 MG/DL (ref 6–20)
CALCIUM SERPL-MCNC: 9.6 MG/DL (ref 8.8–10.4)
CHLORIDE SERPL-SCNC: 103 MMOL/L (ref 98–107)
CHOLEST SERPL-MCNC: 138 MG/DL
CREAT SERPL-MCNC: 1.03 MG/DL (ref 0.67–1.17)
EGFRCR SERPLBLD CKD-EPI 2021: >90 ML/MIN/1.73M2
EST. AVERAGE GLUCOSE BLD GHB EST-MCNC: 111 MG/DL
FASTING STATUS PATIENT QL REPORTED: YES
FASTING STATUS PATIENT QL REPORTED: YES
GLUCOSE SERPL-MCNC: 94 MG/DL (ref 70–99)
HBA1C MFR BLD: 5.5 % (ref 0–5.6)
HCO3 SERPL-SCNC: 27 MMOL/L (ref 22–29)
HDLC SERPL-MCNC: 37 MG/DL
LDLC SERPL CALC-MCNC: 84 MG/DL
NONHDLC SERPL-MCNC: 101 MG/DL
POTASSIUM SERPL-SCNC: 4.6 MMOL/L (ref 3.4–5.3)
PROT SERPL-MCNC: 7.3 G/DL (ref 6.4–8.3)
PSA SERPL DL<=0.01 NG/ML-MCNC: 4.45 NG/ML (ref 0–2.5)
SODIUM SERPL-SCNC: 139 MMOL/L (ref 135–145)
TRIGL SERPL-MCNC: 84 MG/DL

## 2024-10-16 PROCEDURE — 99213 OFFICE O/P EST LOW 20 MIN: CPT | Mod: 25 | Performed by: FAMILY MEDICINE

## 2024-10-16 PROCEDURE — 99396 PREV VISIT EST AGE 40-64: CPT | Performed by: FAMILY MEDICINE

## 2024-10-16 PROCEDURE — 80061 LIPID PANEL: CPT | Performed by: FAMILY MEDICINE

## 2024-10-16 PROCEDURE — 80053 COMPREHEN METABOLIC PANEL: CPT | Performed by: FAMILY MEDICINE

## 2024-10-16 PROCEDURE — G0103 PSA SCREENING: HCPCS | Performed by: FAMILY MEDICINE

## 2024-10-16 PROCEDURE — 36415 COLL VENOUS BLD VENIPUNCTURE: CPT | Performed by: FAMILY MEDICINE

## 2024-10-16 PROCEDURE — 83036 HEMOGLOBIN GLYCOSYLATED A1C: CPT | Performed by: FAMILY MEDICINE

## 2024-10-16 RX ORDER — HYDROXYZINE HYDROCHLORIDE 25 MG/1
25 TABLET, FILM COATED ORAL EVERY 8 HOURS PRN
Qty: 20 TABLET | Refills: 0 | Status: SHIPPED | OUTPATIENT
Start: 2024-10-16

## 2024-10-16 ASSESSMENT — PAIN SCALES - GENERAL: PAINLEVEL: NO PAIN (0)

## 2024-10-16 NOTE — PATIENT INSTRUCTIONS
Schedule orthopedic consult    Keep working on healthy diet/exercise      We will send you lab results    Call/ return to clinic with problems/ questions          Patient Education   Preventive Care Advice   This is general advice given by our system to help you stay healthy. However, your care team may have specific advice just for you. Please talk to your care team about your preventive care needs.  Nutrition  Eat 5 or more servings of fruits and vegetables each day.  Try wheat bread, brown rice and whole grain pasta (instead of white bread, rice, and pasta).  Get enough calcium and vitamin D. Check the label on foods and aim for 100% of the RDA (recommended daily allowance).  Lifestyle  Exercise at least 150 minutes each week  (30 minutes a day, 5 days a week).  Do muscle strengthening activities 2 days a week. These help control your weight and prevent disease.  No smoking.  Wear sunscreen to prevent skin cancer.  Have a dental exam and cleaning every 6 months.  Yearly exams  See your health care team every year to talk about:  Any changes in your health.  Any medicines your care team has prescribed.  Preventive care, family planning, and ways to prevent chronic diseases.  Shots (vaccines)   HPV shots (up to age 26), if you've never had them before.  Hepatitis B shots (up to age 59), if you've never had them before.  COVID-19 shot: Get this shot when it's due.  Flu shot: Get a flu shot every year.  Tetanus shot: Get a tetanus shot every 10 years.  Pneumococcal, hepatitis A, and RSV shots: Ask your care team if you need these based on your risk.  Shingles shot (for age 50 and up)  General health tests  Diabetes screening:  Starting at age 35, Get screened for diabetes at least every 3 years.  If you are younger than age 35, ask your care team if you should be screened for diabetes.  Cholesterol test: At age 39, start having a cholesterol test every 5 years, or more often if advised.  Bone density scan (DEXA):  At age 50, ask your care team if you should have this scan for osteoporosis (brittle bones).  Hepatitis C: Get tested at least once in your life.  STIs (sexually transmitted infections)  Before age 24: Ask your care team if you should be screened for STIs.  After age 24: Get screened for STIs if you're at risk. You are at risk for STIs (including HIV) if:  You are sexually active with more than one person.  You don't use condoms every time.  You or a partner was diagnosed with a sexually transmitted infection.  If you are at risk for HIV, ask about PrEP medicine to prevent HIV.  Get tested for HIV at least once in your life, whether you are at risk for HIV or not.  Cancer screening tests  Cervical cancer screening: If you have a cervix, begin getting regular cervical cancer screening tests starting at age 21.  Breast cancer scan (mammogram): If you've ever had breasts, begin having regular mammograms starting at age 40. This is a scan to check for breast cancer.  Colon cancer screening: It is important to start screening for colon cancer at age 45.  Have a colonoscopy test every 10 years (or more often if you're at risk) Or, ask your provider about stool tests like a FIT test every year or Cologuard test every 3 years.  To learn more about your testing options, visit:   .  For help making a decision, visit:   https://bit.ly/bw43732.  Prostate cancer screening test: If you have a prostate, ask your care team if a prostate cancer screening test (PSA) at age 55 is right for you.  Lung cancer screening: If you are a current or former smoker ages 50 to 80, ask your care team if ongoing lung cancer screenings are right for you.  For informational purposes only. Not to replace the advice of your health care provider. Copyright   2023 [employer]. All rights reserved. Clinically reviewed by the [facility]. [employer] 707173 - REV 01/24.

## 2024-10-16 NOTE — PROGRESS NOTES
Preventive Care Visit  [facility]  Adeel Ramirez MD, Family Medicine  Oct 16, 2024          Theron Pandey is a 48 year old, presenting for the following:  Physical (Fasting)        10/16/2024     9:38 AM   Additional Questions   Roomed by Cynthia López        Health Care Directive  Patient does not have a Health Care Directive or Living Will: Discussed advance care planning with patient; however, patient declined at this time.    HPI  Right shoulder bothering for years    Would like  to see orthopedics     Hard to throw ball    Patient right handed     Feeling  okay    Not on alfuzosin,  didn't help    Urination symptoms only mild     Hydroxyzine as needed  about 2 x per month    Not depressed    Anxious/ stressed     A  little over  two years since mom     Kids healthy     Same school teaching            10/11/2024   General Health   How would you rate your overall physical health? Good   Feel stress (tense, anxious, or unable to sleep) To some extent      (!) STRESS CONCERN      10/11/2024   Nutrition   Three or more servings of calcium each day? Yes   Diet: Regular (no restrictions)   How many servings of fruit and vegetables per day? (!) 0-1   How many sweetened beverages each day? (!) 2            10/11/2024   Exercise   Days per week of moderate/strenous exercise 3 days   Average minutes spent exercising at this level 50 min            10/11/2024   Social Factors   Frequency of gathering with friends or relatives Once a week   Worry food won't last until get money to buy more No   Food not last or not have enough money for food? No   Do you have housing? (Housing is defined as stable permanent housing and does not include staying ouside in a car, in a tent, in an abandoned building, in an overnight shelter, or couch-surfing.) Yes   Are you worried about losing your housing? No   Lack of transportation? No   Unable to get utilities (heat,electricity)? No            10/11/2024  "  Dental   Dentist two times every year? Yes            10/11/2024   TB Screening   Were you born outside of the [place]? No          Today's PHQ-9 Score:       10/15/2024    11:32 AM   PHQ-9 SCORE   PHQ-9 Total Score MyChart 4 (Minimal depression)   PHQ-9 Total Score 4         10/11/2024   Substance Use   Alcohol more than 3/day or more than 7/wk No   Do you use any other substances recreationally? No        Social History     Tobacco Use    Smoking status: Never     Passive exposure: Never    Smokeless tobacco: Never   Vaping Use    Vaping status: Never Used   Substance Use Topics    Alcohol use: Yes     Comment: socially    Drug use: No             10/11/2024   One time HIV Screening   Previous HIV test? No          10/11/2024   STI Screening   New sexual partner(s) since last STI/HIV test? No      ASCVD Risk   The 10-year ASCVD risk score (Koby SHAH, et al., 2019) is: 2.4%    Values used to calculate the score:      Age: 48 years      Sex: Male      Is Non- : No      Diabetic: No      Tobacco smoker: No      Systolic Blood Pressure: 132 mmHg      Is BP treated: No      HDL Cholesterol: 39 mg/dL      Total Cholesterol: 167 mg/dL       Reviewed and updated as needed this visit by Provider                        Objective    Exam  BP (!) 132/90 (BP Location: Left arm, Patient Position: Chair, Cuff Size: Adult Regular)   Pulse 76   Temp 97.2  F (36.2  C) (Temporal)   Resp 16   Ht 1.82 m (5' 11.65\")   Wt 86.6 kg (191 lb)   SpO2 100%   BMI 26.16 kg/m     Estimated body mass index is 26.16 kg/m  as calculated from the following:    Height as of this encounter: 1.82 m (5' 11.65\").    Weight as of this encounter: 86.6 kg (191 lb).    Physical Exam  GENERAL: alert and no distress  EYES: Eyes grossly normal to inspection, PERRL and conjunctivae and sclerae normal  HENT: ear canals and TM's normal, nose and mouth without ulcers or lesions  NECK: no adenopathy, no asymmetry, masses, or " scars  RESP: lungs clear to auscultation - no rales, rhonchi or wheezes  CV: regular rate and rhythm, normal S1 S2, no S3 or S4, no murmur, click or rub, no peripheral edema  ABDOMEN: soft, nontender, no hepatosplenomegaly, no masses and bowel sounds normal  MS: no gross musculoskeletal defects noted, no edema  SKIN: no suspicious lesions or rashes  NEURO: Normal strength and tone, mentation intact and speech normal  PSYCH: mentation appears normal, affect normal/bright  Some tenderness right lateral shoulder.  Very mild restriction range of motion.    1. Routine general medical examination at a health care facility    2. High triglycerides    3. Right shoulder pain, unspecified chronicity    4. Hyperlipidemia LDL goal <100    5. Screening for prostate cancer    6. Screening for diabetes mellitus    7. Anxiety        Overall patient very healthy  Ssri working very well.  Patient wants to stay on same does  Also refill hydroxyzine, used sparingly  Keep working on healthy diet/exercise  No std concern  Patient to schedule consult with shoulder specialist  Up to date colonoscopy           Signed Electronically by: Adeel Ramirez MD    Answers submitted by the patient for this visit:  Patient Health Questionnaire (Submitted on 10/15/2024)  If you checked off any problems, how difficult have these problems made it for you to do your work, take care of things at home, or get along with other people?: Somewhat difficult  PHQ9 TOTAL SCORE: 4

## 2024-10-17 ENCOUNTER — PATIENT OUTREACH (OUTPATIENT)
Dept: CARE COORDINATION | Facility: CLINIC | Age: 49
End: 2024-10-17
Payer: COMMERCIAL

## 2024-10-17 DIAGNOSIS — R97.20 ELEVATED PROSTATE SPECIFIC ANTIGEN (PSA): Primary | ICD-10-CM

## 2024-10-17 NOTE — RESULT ENCOUNTER NOTE
Prostate blood test is high, even when using the old normal range.  Thus it is prudent to see urology.  I put in a referral. Call [phone] to schedule.    Other labs are fine.    Adeel Ramirez MD

## 2024-10-26 NOTE — PROGRESS NOTES
ASSESSMENT & PLAN    Dannie was seen today for pain.    Diagnoses and all orders for this visit:    Chronic right shoulder pain  -     Orthopedic  Referral  -     XR Shoulder Right G/E 3 Views; Future  -     MR Shoulder Right w/o Contrast; Future  -     Physical Therapy  Referral; Future      On review, had MRI right shoulder in 2017. See below.  Questions answered. Discussed signs and symptoms that may indicate more serious issues; the patient was instructed to seek appropriate care if noted. Dannie indicates understanding of these issues and agrees with the plan.      See Patient Instructions  Patient Instructions   Right shoulder issues consistent with rotator cuff source, we discussed potential for impingement versus tendinopathy, potentially rotator cuff tearing.  Discussed considerations around physical therapy, additional imaging with MRI, also potential for use of anti-inflammatory medication or subacromial steroid injection.  Following discussion, plan MRI right shoulder next, order placed, scheduling information is below.  Also went ahead and placed physical therapy referral, anticipating potential for PT, but await MRI findings.    Advanced imaging is done by appointment. Please call [facility] ([facility]/[place]) [phone] , [facility] ([facility]/[place]) [phone] , or [facility] ([facility]) [phone]  to schedule your MRI.     Some insurance companies may require a prior authorization to be completed which can delay the time until you are able to schedule your appointment.       If you are active on [employer], you may have access to your test results before your provider is able to review the study and advise on next steps.      The clinic will contact you with results either via phone or [employer]. If you have not heard from the clinic within 2-3 days following your MRI, please contact us at  [phone] or via [employer].  Alternatively, if interested in further discussion with me about the findings and next steps, feel free to schedule a telephone visit, video visit, or recheck appointment in clinic.        If you have any further questions for your physician or physician s care team you can contact them thru [employer] or by calling [phone].      Ayo Butt [facility]      CC: Adeel Ramirez      -----  Chief Complaint   Patient presents with    Right Shoulder - Pain       SUBJECTIVE  Dannie Hart is a/an 48 year old male who is seen in consultation at the request of  Adeel Ramirez M.D. for evaluation of Right shoulder.     The patient is seen by themselves.  The patient is Right handed    Onset: 3+ years(s) ago. Reports insidious onset without acute precipitating event. Throwing a ball, coaching baseball and football  Location of Pain: right shoulder, lateral/tip of shoulder and into upper arm  Worsened by: Cocking phase of motion, throwing, sleeping on the shoulder  Better with:  Treatments tried: Ice, ibuprofen  Associated symptoms: no distal numbness or tingling; denies swelling or warmth    Orthopedic/Surgical history: NO, shoulder injury with Football many years ago  Social History/Occupation:  in [place]    **  Above information per rooming staff.  Additional history:  Some pain at rest currently, points more lateral shoulder.  Pain with abduction, lifting weights, and pain more with throwing; with throwing, gets radiating pain and aching.  Has noted some clicking/grinding right shoulder at times, no pain associated.        REVIEW OF SYSTEMS:  Review of Systems    OBJECTIVE:  Ht 1.829 m (6')   Wt 86.6 kg (191 lb)   BMI 25.90 kg/m           Right Shoulder exam    ROM:      forward flexion grossly symmetric, ~160-170, some pain        abduction grossly symmetric, ~150-160, pain       internal rotation 1-2 vertebral  segments lower than left, pain       external rotation pain end range    Strength:      abduction 4+/5       internal rotation 5/5       external rotation 4/5  Reduced right compared to left, with pain with testing ER, abduction    Impingement testing:      positive (+) Danielle       positive (+) empty can    Skin:      no visible deformities       well perfused       capillary refill brisk    Sensation:      normal sensation over shoulder and upper extremity       RADIOLOGY:  Final results and radiologist's interpretation, available in the [facility] health record.  Images were reviewed with the patient in the office today.  My personal interpretation of the performed imaging: no acute bony abnormality noted. Minimal distal clavicle hypertrophy. GH and AC joints otherwise appear preserved.       Recent Results (from the past 24 hours)   XR Shoulder Right G/E 3 Views    Narrative    XR SHOULDER RIGHT G/E 3 VIEWS 10/28/2024 10:41 AM     HISTORY: Diffuse right shoulder pain, rule out arthritic changes;  Right shoulder pain, unspecified chronicity    COMPARISON: None.         Impression    IMPRESSION: The glenohumeral and acromioclavicular joints are negative  for fracture or dislocation. There is mild degenerative change.    DEBO CAMPOS MD         SYSTEM ID:  [id number]         ===========================    On chart review, noted the following:    [employer]  Outside Information  Results  MR SHOULDER RIGHT W/O CONTRAST (Order [id number])     MR SHOULDER RIGHT W/O CONTRAST  Order: [id number]  Impression    IMPRESSION:      1. Small articular surface partial tear at the anterior infraspinatus tendon insertion measuring only 3 mm in size.    2. There is mild supraspinatus and infraspinatus tendinopathy otherwise with no full-thickness rotator cuff tear.    3. Moderate a.c. joint arthrosis.    4. Mild subacromial/subdeltoid bursal inflammation.    5. Although a discrete labral tear is not seen on this study, there is a  tiny 2-3 mm cyst along the inferior glenoid rim which does raise suspicion for a small occult inferior labral tear.  Narrative    TECHNIQUE:  Routine MRI of the right shoulder was performed without contrast.    COMPARISON:  None.    FINDINGS:    CORACOACROMIAL ARCH/AC JOINT: Moderate a.c. joint arthrosis. No acromial downsloping or narrowing of the subacromial outlet. Subcoracoid interval is within normal limits.    ROTATOR CUFF:  There is a small articular surface partial tear of the anterior infraspinatus tendon on coronal image 15. This measures only 3 mm in size however. There is mild supraspinatus and infraspinatus tendinopathy otherwise with no full-thickness rotator cuff tear. There is no atrophy of the rotator cuff muscles.    SUBACROMIAL/SUBDELTOID BURSA:  Minor edema signal in the bursa without significant bursal fluid collection.    GLENOHUMERAL JOINT:  No discrete chondral defects. Small early marginal osteophytes of the glenoid. There is no significant joint effusion. No osteocartilaginous bodies are identified.    LONG HEAD OF THE BICEPS TENDON/GLENOID LABRUM:  The intra and extraarticular portions of the long head of the biceps tendon are normal. The biceps-labral anchor is intact. There is tiny cyst formation along the anterior-inferior glenoid rim measuring only 2-3 mm in size on coronal images 14 and 15 which does raise suspicion for nondisplaced subtle inferior labral tear. No definite labral tear otherwise.    MARROW AND SOFT TISSUES:  Minor cystic changes at the greater tuberosity posteriorly. There is no soft tissue mass.  Exam End: 03/09/17 10:14 AM    Specimen Collected: 03/09/17  9:42 AM Last Resulted: 03/09/17 10:22 AM   Received From: [employer]  Result Received: 10/18/23 10:35 AM

## 2024-10-28 ENCOUNTER — OFFICE VISIT (OUTPATIENT)
Dept: ORTHOPEDICS | Facility: CLINIC | Age: 49
End: 2024-10-28
Attending: FAMILY MEDICINE
Payer: COMMERCIAL

## 2024-10-28 ENCOUNTER — ANCILLARY PROCEDURE (OUTPATIENT)
Dept: GENERAL RADIOLOGY | Facility: CLINIC | Age: 49
End: 2024-10-28
Attending: PEDIATRICS
Payer: COMMERCIAL

## 2024-10-28 VITALS — HEIGHT: 72 IN | WEIGHT: 191 LBS | BODY MASS INDEX: 25.87 KG/M2

## 2024-10-28 DIAGNOSIS — G89.29 CHRONIC RIGHT SHOULDER PAIN: Primary | ICD-10-CM

## 2024-10-28 DIAGNOSIS — M25.511 CHRONIC RIGHT SHOULDER PAIN: Primary | ICD-10-CM

## 2024-10-28 DIAGNOSIS — M25.511 RIGHT SHOULDER PAIN, UNSPECIFIED CHRONICITY: ICD-10-CM

## 2024-10-28 PROCEDURE — 73030 X-RAY EXAM OF SHOULDER: CPT | Mod: TC | Performed by: RADIOLOGY

## 2024-10-28 PROCEDURE — 99244 OFF/OP CNSLTJ NEW/EST MOD 40: CPT | Performed by: PEDIATRICS

## 2024-10-28 NOTE — PATIENT INSTRUCTIONS
Right shoulder issues consistent with rotator cuff source, we discussed potential for impingement versus tendinopathy, potentially rotator cuff tearing.  Discussed considerations around physical therapy, additional imaging with MRI, also potential for use of anti-inflammatory medication or subacromial steroid injection.  Following discussion, plan MRI right shoulder next, order placed, scheduling information is below.  Also went ahead and placed physical therapy referral, anticipating potential for PT, but await MRI findings.    Advanced imaging is done by appointment. Please call [facility] ([facility]/[place]) [phone] , [facility] ([facility]/[place]) [phone] , or [facility] ([facility]) [phone]  to schedule your MRI.     Some insurance companies may require a prior authorization to be completed which can delay the time until you are able to schedule your appointment.       If you are active on [employer], you may have access to your test results before your provider is able to review the study and advise on next steps.      The clinic will contact you with results either via phone or [employer]. If you have not heard from the clinic within 2-3 days following your MRI, please contact us at [phone] or via [employer].  Alternatively, if interested in further discussion with me about the findings and next steps, feel free to schedule a telephone visit, video visit, or recheck appointment in clinic.        If you have any further questions for your physician or physician s care team you can contact them thru [employer] or by calling [phone].

## 2024-10-28 NOTE — LETTER
10/28/2024      Dannie Hart  [address] [facility] [address]      Dear Colleague,    Thank you for referring your patient, Dannie Hart, to the [facility]. Please see a copy of my visit note below.    ASSESSMENT & PLAN    Dannie was seen today for pain.    Diagnoses and all orders for this visit:    Chronic right shoulder pain  -     Orthopedic  Referral  -     XR Shoulder Right G/E 3 Views; Future  -     MR Shoulder Right w/o Contrast; Future  -     Physical Therapy  Referral; Future      On review, had MRI right shoulder in 2017. See below.  Questions answered. Discussed signs and symptoms that may indicate more serious issues; the patient was instructed to seek appropriate care if noted. Dannie indicates understanding of these issues and agrees with the plan.      See Patient Instructions  Patient Instructions   Right shoulder issues consistent with rotator cuff source, we discussed potential for impingement versus tendinopathy, potentially rotator cuff tearing.  Discussed considerations around physical therapy, additional imaging with MRI, also potential for use of anti-inflammatory medication or subacromial steroid injection.  Following discussion, plan MRI right shoulder next, order placed, scheduling information is below.  Also went ahead and placed physical therapy referral, anticipating potential for PT, but await MRI findings.    Advanced imaging is done by appointment. Please call [facility] ([facility]/[place]) [phone] , [facility] ([facility]/[place]) [phone] , or [facility] ([facility]) [phone]  to schedule your MRI.     Some insurance companies may require a prior authorization to be completed which can delay the time until you are able to schedule your appointment.       If you are active on [employer], you may have access to your  test results before your provider is able to review the study and advise on next steps.      The clinic will contact you with results either via phone or [employer]. If you have not heard from the clinic within 2-3 days following your MRI, please contact us at [phone] or via [employer].  Alternatively, if interested in further discussion with me about the findings and next steps, feel free to schedule a telephone visit, video visit, or recheck appointment in clinic.        If you have any further questions for your physician or physician s care team you can contact them thru [employer] or by calling [phone].      Ayo Butt [facility]      CC: Adeel Ramirez      -----  Chief Complaint   Patient presents with     Right Shoulder - Pain       SUBJECTIVE  Dannie Hart is a/an 48 year old male who is seen in consultation at the request of  Adeel Ramirez M.D. for evaluation of Right shoulder.     The patient is seen by themselves.  The patient is Right handed    Onset: 3+ years(s) ago. Reports insidious onset without acute precipitating event. Throwing a ball, coaching baseball and football  Location of Pain: right shoulder, lateral/tip of shoulder and into upper arm  Worsened by: Cocking phase of motion, throwing, sleeping on the shoulder  Better with:  Treatments tried: Ice, ibuprofen  Associated symptoms: no distal numbness or tingling; denies swelling or warmth    Orthopedic/Surgical history: NO, shoulder injury with Football many years ago  Social History/Occupation:  in [place]    **  Above information per rooming staff.  Additional history:  Some pain at rest currently, points more lateral shoulder.  Pain with abduction, lifting weights, and pain more with throwing; with throwing, gets radiating pain and aching.  Has noted some clicking/grinding right shoulder at times, no pain associated.        REVIEW OF SYSTEMS:  Review of  Systems    OBJECTIVE:  Ht 1.829 m (6')   Wt 86.6 kg (191 lb)   BMI 25.90 kg/m           Right Shoulder exam    ROM:      forward flexion grossly symmetric, ~160-170, some pain        abduction grossly symmetric, ~150-160, pain       internal rotation 1-2 vertebral segments lower than left, pain       external rotation pain end range    Strength:      abduction 4+/5       internal rotation 5/5       external rotation 4/5  Reduced right compared to left, with pain with testing ER, abduction    Impingement testing:      positive (+) Danielle       positive (+) empty can    Skin:      no visible deformities       well perfused       capillary refill brisk    Sensation:      normal sensation over shoulder and upper extremity       RADIOLOGY:  Final results and radiologist's interpretation, available in the [facility] health record.  Images were reviewed with the patient in the office today.  My personal interpretation of the performed imaging: no acute bony abnormality noted. Minimal distal clavicle hypertrophy. GH and AC joints otherwise appear preserved.       Recent Results (from the past 24 hours)   XR Shoulder Right G/E 3 Views    Narrative    XR SHOULDER RIGHT G/E 3 VIEWS 10/28/2024 10:41 AM     HISTORY: Diffuse right shoulder pain, rule out arthritic changes;  Right shoulder pain, unspecified chronicity    COMPARISON: None.         Impression    IMPRESSION: The glenohumeral and acromioclavicular joints are negative  for fracture or dislocation. There is mild degenerative change.    DEBO CAMPOS MD         SYSTEM ID:  [id number]         ===========================    On chart review, noted the following:    [facility]  Outside Information  Results  MR SHOULDER RIGHT W/O CONTRAST (Order [id number])     MR SHOULDER RIGHT W/O CONTRAST  Order: [id number]  Impression    IMPRESSION:      1. Small articular surface partial tear at the anterior infraspinatus tendon insertion measuring only 3 mm in size.    2. There is mild  supraspinatus and infraspinatus tendinopathy otherwise with no full-thickness rotator cuff tear.    3. Moderate a.c. joint arthrosis.    4. Mild subacromial/subdeltoid bursal inflammation.    5. Although a discrete labral tear is not seen on this study, there is a tiny 2-3 mm cyst along the inferior glenoid rim which does raise suspicion for a small occult inferior labral tear.  Narrative    TECHNIQUE:  Routine MRI of the right shoulder was performed without contrast.    COMPARISON:  None.    FINDINGS:    CORACOACROMIAL ARCH/AC JOINT: Moderate a.c. joint arthrosis. No acromial downsloping or narrowing of the subacromial outlet. Subcoracoid interval is within normal limits.    ROTATOR CUFF:  There is a small articular surface partial tear of the anterior infraspinatus tendon on coronal image 15. This measures only 3 mm in size however. There is mild supraspinatus and infraspinatus tendinopathy otherwise with no full-thickness rotator cuff tear. There is no atrophy of the rotator cuff muscles.    SUBACROMIAL/SUBDELTOID BURSA:  Minor edema signal in the bursa without significant bursal fluid collection.    GLENOHUMERAL JOINT:  No discrete chondral defects. Small early marginal osteophytes of the glenoid. There is no significant joint effusion. No osteocartilaginous bodies are identified.    LONG HEAD OF THE BICEPS TENDON/GLENOID LABRUM:  The intra and extraarticular portions of the long head of the biceps tendon are normal. The biceps-labral anchor is intact. There is tiny cyst formation along the anterior-inferior glenoid rim measuring only 2-3 mm in size on coronal images 14 and 15 which does raise suspicion for nondisplaced subtle inferior labral tear. No definite labral tear otherwise.    MARROW AND SOFT TISSUES:  Minor cystic changes at the greater tuberosity posteriorly. There is no soft tissue mass.  Exam End: 03/09/17 10:14 AM    Specimen Collected: 03/09/17  9:42 AM Last Resulted: 03/09/17 10:22 AM   [facility]  Result Received: 10/18/23 10:35 AM                    Again, thank you for allowing me to participate in the care of your patient.        Sincerely,        Ayo Butt,

## 2024-11-04 ENCOUNTER — OFFICE VISIT (OUTPATIENT)
Dept: UROLOGY | Facility: CLINIC | Age: 49
End: 2024-11-04
Attending: FAMILY MEDICINE
Payer: COMMERCIAL

## 2024-11-04 VITALS
DIASTOLIC BLOOD PRESSURE: 81 MMHG | SYSTOLIC BLOOD PRESSURE: 120 MMHG | HEIGHT: 72 IN | WEIGHT: 191 LBS | OXYGEN SATURATION: 97 % | BODY MASS INDEX: 25.87 KG/M2 | TEMPERATURE: 97.7 F | HEART RATE: 78 BPM

## 2024-11-04 DIAGNOSIS — R97.20 ELEVATED PROSTATE SPECIFIC ANTIGEN (PSA): ICD-10-CM

## 2024-11-04 PROCEDURE — 99213 OFFICE O/P EST LOW 20 MIN: CPT | Performed by: STUDENT IN AN ORGANIZED HEALTH CARE EDUCATION/TRAINING PROGRAM

## 2024-11-04 RX ORDER — IBUPROFEN 200 MG
200-400 TABLET ORAL
COMMUNITY

## 2024-11-04 ASSESSMENT — PAIN SCALES - GENERAL: PAINLEVEL_OUTOF10: MODERATE PAIN (4)

## 2024-11-04 NOTE — PROGRESS NOTES
Chief Complaint:   Elevated PSA         History of Present Illness:   Dannie Hart is a 48 year old male with a history of major depressive disorder who presents for evaluation of elevated PSA.    PSA history:  10/16/2024: 4.45  1/15/2024: 2.77  10/18/2023: 3.75  9/26/2022: 3.26  6/4/2021: 3.89  11/12/2019: 3.30  9/11/2018: 2.35    He has never had a prostate MRI or prostate biopsy. He has no family history of prostate cancer.         Past Medical History:   No past medical history on file.         Past Surgical History:     Past Surgical History:   Procedure Laterality Date    APPENDECTOMY  2011    HERNIA REPAIR  2010    left inguinal    RELEASE CARPAL TUNNEL  03/2018    right wrist     VASECTOMY  2016            Medications     Current Outpatient Medications   Medication Sig Dispense Refill    ibuprofen (ADVIL/MOTRIN) 200 MG tablet Take 200-400 mg by mouth.      sertraline (ZOLOFT) 50 MG tablet Take 1 tablet (50 mg) by mouth daily. 90 tablet 3    hydrOXYzine HCl (ATARAX) 25 MG tablet Take 1 tablet (25 mg) by mouth every 8 hours as needed for anxiety. 20 tablet 0     No current facility-administered medications for this visit.            Allergies:   Hydrocodone-acetaminophen         Review of Systems:  From intake questionnaire   Negative 14 system review except as noted on HPI, nurse's note.         Physical Exam:   Patient is a 48 year old  male   Vitals: Blood pressure 120/81, pulse 78, temperature 97.7  F (36.5  C), temperature source Tympanic, height 1.829 m (6'), weight 86.6 kg (191 lb), SpO2 97%.  General Appearance Adult: Alert, no acute distress, oriented.  Lungs: Non-labored breathing.  Heart: No obvious jugular venous distension present.  Neuro: Alert, oriented, speech and mentation normal      Labs and Pathology:    I personally reviewed all applicable laboratory data and went over findings with patient  Significant for:    CBC RESULTS:  Recent Labs   Lab Test 01/15/24  1042 09/26/22  0934  04/09/21  1527 11/12/19  0912   WBC 7.1 5.6 6.2 5.7   HGB 15.4 16.9 16.0 16.0    273 251 254        BMP RESULTS:  Recent Labs   Lab Test 10/16/24  1007 01/15/24  1042 09/26/22  0934 06/04/21  0931 04/09/21  1527 09/04/20  0000 11/12/19  0912    142 138 142  --   --  141   POTASSIUM 4.6 4.4 4.1 4.1 3.9 4.2 4.2   CHLORIDE 103 107 106 110*  --   --  106   CO2 27 28 28 28  --   --  31   ANIONGAP 9 7 4 4  --   --  4   GLC 94 102* 95 98  --  110* 86   BUN 14.5 17.8 20 18  --   --  17   CR 1.03 0.92 0.98 0.89 1.12 0.91 0.95   GFRESTIMATED >90 >90 >90 >90 80 >60 >90   GFRESTBLACK  --   --   --  >90 >90 >60 >90   NUVIA 9.6 9.2 9.0 8.6  --   --  9.1       UA RESULTS:   Recent Labs   Lab Test 01/15/24  1042   SG >=1.030   URINEPH 5.5   NITRITE Negative   RBCU 0-2   WBCU 0-5       PSA RESULTS  PSA   Date Value Ref Range Status   06/04/2021 3.89 0 - 4 ug/L Final     Comment:     Assay Method:  Chemiluminescence using Siemens Vista analyzer   11/12/2019 3.30 0 - 4 ug/L Final     Comment:     Assay Method:  Chemiluminescence using Siemens Vista analyzer   09/11/2018 2.35 0 - 4 ug/L Final     Comment:     Assay Method:  Chemiluminescence using Siemens Vista analyzer     Prostate Specific Antigen Screen   Date Value Ref Range Status   10/16/2024 4.45 (H) 0.00 - 2.50 ng/mL Final   01/15/2024 2.77 (H) 0.00 - 2.50 ng/mL Final   10/18/2023 3.75 (H) 0.00 - 2.50 ng/mL Final   09/26/2022 3.26 0.00 - 4.00 ug/L Final            Assessment and Plan:     Assessment: 48 year old male who presents for evaluation of elevated PSA. We discussed the possible causes of elevated PSA including prostate cancer, prostate enlargement, UTI, voiding symptoms, and ejaculation or bike/motorcycle riding around the time of lab draw.     We discussed the evaluation of elevated PSA, including prostate MRI and subsequent prostate biopsy, if indicated. Given his young age, we discussed obtaining a prostate MRI for further evaluation. He is agreeable.      He was on alfuzosin for LUTS. We will revisit his urinary symptoms after prostate MRI.     Plan:  Obtain prostate MRI. Patient was given number to schedule.     FRANKIE STANTON PA-C  Department of Urology

## 2024-11-05 ENCOUNTER — HOSPITAL ENCOUNTER (OUTPATIENT)
Dept: MRI IMAGING | Facility: CLINIC | Age: 49
Discharge: HOME | End: 2024-11-05
Attending: PEDIATRICS | Admitting: PEDIATRICS
Payer: COMMERCIAL

## 2024-11-05 DIAGNOSIS — G89.29 CHRONIC RIGHT SHOULDER PAIN: ICD-10-CM

## 2024-11-05 DIAGNOSIS — M25.511 CHRONIC RIGHT SHOULDER PAIN: ICD-10-CM

## 2024-11-05 PROCEDURE — 73221 MRI JOINT UPR EXTREM W/O DYE: CPT | Mod: 26 | Performed by: RADIOLOGY

## 2024-11-05 PROCEDURE — 73221 MRI JOINT UPR EXTREM W/O DYE: CPT | Mod: RT

## 2024-11-19 ENCOUNTER — OFFICE VISIT (OUTPATIENT)
Dept: ORTHOPEDICS | Facility: CLINIC | Age: 49
End: 2024-11-19
Payer: COMMERCIAL

## 2024-11-19 VITALS — HEIGHT: 72 IN | BODY MASS INDEX: 25.87 KG/M2 | WEIGHT: 191 LBS

## 2024-11-19 DIAGNOSIS — M75.111 INCOMPLETE TEAR OF RIGHT ROTATOR CUFF, UNSPECIFIED WHETHER TRAUMATIC: ICD-10-CM

## 2024-11-19 DIAGNOSIS — G89.29 CHRONIC RIGHT SHOULDER PAIN: Primary | ICD-10-CM

## 2024-11-19 DIAGNOSIS — M25.511 CHRONIC RIGHT SHOULDER PAIN: Primary | ICD-10-CM

## 2024-11-19 PROCEDURE — 99213 OFFICE O/P EST LOW 20 MIN: CPT | Mod: 25 | Performed by: PEDIATRICS

## 2024-11-19 PROCEDURE — 20610 DRAIN/INJ JOINT/BURSA W/O US: CPT | Mod: RT | Performed by: PEDIATRICS

## 2024-11-19 RX ADMIN — LIDOCAINE HYDROCHLORIDE 2 ML: 10 INJECTION, SOLUTION INFILTRATION; PERINEURAL at 16:31

## 2024-11-19 RX ADMIN — TRIAMCINOLONE ACETONIDE 40 MG: 40 INJECTION, SUSPENSION INTRA-ARTICULAR; INTRAMUSCULAR at 16:31

## 2024-11-19 ASSESSMENT — ACTIVITIES OF DAILY LIVING (ADL)
WHEN_LYING_ON_THE_INVOLVED_SIDE: 6
REACHING_FOR_SOMETHING_ON_A_HIGH_SHELF: 9
REMOVING_SOMETHING_FROM_YOUR_BACK_POCKET: 1
WASHING_YOUR_BACK: 5
PLACING_AN_OBJECT_ON_A_HIGH_SHELF: 6
WASHING_YOUR_HAIR?: 2
CARRYING_A_HEAVY_OBJECT_OF_10_POUNDS: 0
PLEASE_INDICATE_YOR_PRIMARY_REASON_FOR_REFERRAL_TO_THERAPY:: SHOULDER
AT_ITS_WORST?: 9
PUTTING_ON_YOUR_PANTS: 0
PUTTING_ON_AN_UNDERSHIRT_OR_A_PULLOVER_SWEATER: 5
PUSHING_WITH_THE_INVOLVED_ARM: 8
TOUCHING_THE_BACK_OF_YOUR_NECK: 8
PUTTING_ON_A_SHIRT_THAT_BUTTONS_DOWN_THE_FRONT: 0

## 2024-11-19 NOTE — Clinical Note
"2024      Dannie Hart  [address] [facility] [address]      Dear Colleague,    Thank you for referring your patient, Dannie Hart, to the [facility]. Please see a copy of my visit note below.    ASSESSMENT & PLAN    There are no diagnoses linked to this encounter.  This issue is {ACUTE/CHRONIC:084253} and {IMPROVING WORSENIN}.    {FOLLOW UP PLANS (Optional):089718}    Ayo Butt DO  [facility]    SUBJECTIVE- Interim History 2024    Chief Complaint   Patient presents with    Right Shoulder - Pain, Follow Up       Dannie Hrat is a 49 year old male who is seen in f/u up for Data Unavailable. Since last visit on 10/28/24 patient has felt that rest has been good the last 5 days, noting that he has been resting. Throwing still bothers him, as well as lateral raises or  presses. Laying on the shoulder with soreness causes continued pain.    The patient is seen by themselves.  The patient is Right handed    Orthopedic/Surgical history: NO, shoulder injury with Football many years ago  Social History/Occupation:  in [place]      REVIEW OF SYSTEMS:  Review of Systems    OBJECTIVE:  Wt 86.6 kg (191 lb)   BMI 25.90 kg/m     General: healthy, alert and in no distress  Skin: no suspicious lesions or rash.  CV: distal perfusion intact ***  Resp: normal respiratory effort without conversational dyspnea   Psych: normal mood and affect  Gait: {FSOC GAIT:249286::\"NORMAL\"}  Neuro: Normal light sensory exam of *** extremity ***    RADIOLOGY:  Final results and radiologist's interpretation, available in the [facility] health record.  Images were reviewed with the patient in the office today.  My personal interpretation of the performed imaging: ***    EXAM: MR right shoulder without  contrast 2024 8:13 AM     TECHNIQUE: Multiplanar, multisequence imaging of the right " shoulder  were obtained without administration of intravenous or intra-articular  gadolinium contrast using routine protocol.     History: evaluate rotator cuff; Shoulder pain; Rotator cuff injury; No  known/automatically detected potential contraindications to imaging;  Chronic right shoulder pain; Chronic right shoulder pain     Comparison: 4/12/2012     Findings:     ROTATOR CUFF and ASSOCIATED STRUCTURES  Rotator cuff: On a background of tendinosis, low to moderate grade  intrasubstance tear of supraspinatus tendon middle fibers at the  footprint involving approximately 4 mm in anterior posterior  dimension.      On a background of tendinosis, moderate to high-grade intrasubstance  tear of the infraspinatus anterior and middle fibers at the footprint  involving approximately 9 mm in anterior posterior dimension. Possible  articular sided communication channel may be present.     Teres minor tendon is intact. Subscapularis tendinosis.     Bursa: Mild subacromial/subdeltoid bursitis.     Musculature: Muscle bulk of rotator cuff is preserved.  Deltoid muscle  bulk is also preserved.  No muscle edema.     Acromioclavicular joint  There are moderate degenerative changes of the acromioclavicular  joint. Acromion is type 2 in sagittal morphology.  Coracoacromial  ligament is not thickened.     OSSEOUS STRUCTURES  No fracture, marrow contusion or marrow infiltration. Subcortical  cystlike changes at the greater tuberosity posteriorly.     LONG BICIPITAL TENDON  The long head of the biceps tendon is normally situated within the  bicipital groove. No complete or partial biceps tendon tear is  present.     GLENOHUMERAL JOINT  Joint fluid: Physiologic amount of joint fluid is  present.     Cartilage and subarticular bone:  No focal hyaline cartilage defects  are noted. No Hill-Sachs, reverse Hill-Sachs, or bony Bankart lesions  are seen.     Labrum: Limited assessment on this study with relative lack of joint  distention  shows suspected inferior and posterosuperior labral tear.  Additionally diffuse signal heterogeneity anterior labrum, which may  be related to additional area of tearing.     ANCILLARY FINDINGS:  Mild partial effacement of rotator interval fat without substantial  edema, nonspecific.                                                                      Impression:  1. Since comparison MR from 2012, new partial tearing of the rotator  cuff tendons.  *  Low to moderate grade intrasubstance tear of supraspinatus tendon  middle fibers at the footprint.   *  Moderate to high-grade intrasubstance  tear of the infraspinatus  anterior and middle fibers at the footprint. Possible articular sided  communication channel may be present.  *  No muscle atrophy.  2. Moderate acromioclavicular joint degenerative changes.  3. Mild subacromial/subdeltoid bursitis.  4. Query inferior and posterosuperior labral tear, and possible  additional anterior labral tear.     MANJULA LIRA     Large Joint Injection/Arthocentesis: R subacromial bursa    Date/Time: 11/19/2024 4:31 PM    Performed by: Ayo Butt DO  Authorized by: Ayo Butt DO    Indications:  Pain  Needle Size:  25 G  Guidance: landmark guided    Approach:  Posterolateral  Location:  Shoulder      Site:  R subacromial bursa  Medications:  40 mg triamcinolone 40 MG/ML; 2 mL lidocaine 1 %  Outcome:  Tolerated well, no immediate complications  Procedure discussed: discussed risks, benefits, and alternatives    Consent Given by:  Patient  Timeout: timeout called immediately prior to procedure    Prep: patient was prepped and draped in usual sterile fashion          {[facility] 2021 Documentation (Optional):225624}  {2021 E&M time (Optional):222681}  {Provider  Link to [facility] Help Grid :356839}         Again, thank you for allowing me to participate in the care of your patient.        Sincerely,        Ayo Butt DO

## 2024-11-19 NOTE — PROGRESS NOTES
ASSESSMENT & PLAN    Dannie was seen today for pain and follow up.    Diagnoses and all orders for this visit:    Chronic right shoulder pain  -     Large Joint Injection/Arthocentesis: R subacromial bursa    Incomplete tear of right rotator cuff, unspecified whether traumatic  -     Large Joint Injection/Arthocentesis: R subacromial bursa      Options include: symptom treatment, activity modification/rest, imaging, rehab, injection therapy, medication, and referral to ortho surgery. Following discussion, plan:  Injection, proceed with PT.  If ongoing issues despite injection, PT, consideration for referral to ortho surgery.  See below.  Questions answered. Discussed signs and symptoms that may indicate more serious issues; the patient was instructed to seek appropriate care if noted. Dannie indicates understanding of these issues and agrees with the plan.      See Patient Instructions  Patient Instructions   Right shoulder MRI reviewed, with partial thickness tendon tearing.  Right shoulder subacromial steroid injection done today.  Proceed with physical therapy as planned.  With injection, should take effect in next few days, can be up to 1-2 weeks for maximal benefit.  With the physical therapy, plan to monitor 4-6 weeks.    If improving, then follow-up is open ended.  Otherwise, contact clinic or follow-up if any questions/concerns.    If you have any further questions for your physician or physician s care team you can contact them thru [employer] or by calling [phone].          Injection Discharge Instructions    You may shower, however avoid swimming, tub baths or hot tubs for 24 hours following your procedure  You may have a mild to moderate increase in pain for several days following the injection.  It may take up to 14 days for the steroid medication to start working although you may feel the effect as early as a few days after the procedure.  You may use ice packs for 10-15 minutes, 3 to 4 times a day at  the injection site for comfort  You may use anti-inflammatory medications (such as Ibuprofen or Aleve or Advil) if you are able to take safely, or acetaminophen (Tylenol) for pain control if necessary  If you were fasting, you may resume your normal diet and medications after the procedure  If you have diabetes, check your blood sugar more frequently than usual as your blood sugar may be higher than normal for 10-14 days following a steroid injection. Contact your doctor who manages your diabetes if your blood sugar is higher than usual    If you experience any of the following, call the clinic @ [phone]  - Fever over 100 degrees F  - Swelling, bleeding, redness, drainage, warmth at the injection site  - New or worsening pain      Ayo Jones [facility]    SUBJECTIVE- Interim History November 19, 2024    Chief Complaint   Patient presents with    Right Shoulder - Pain, Follow Up       Dannie Hart is a 49 year old male who is seen in f/u up for    Chronic right shoulder pain  Incomplete tear of right rotator cuff, unspecified whether traumatic. Since last visit on 10/28/24 patient has felt that rest has been good the last 5 days, noting that he has been resting. Throwing still bothers him, as well as lateral raises or  presses. Laying on the shoulder with soreness causes continued pain.    The patient is seen by themselves.  The patient is Right handed    Orthopedic/Surgical history: NO, shoulder injury with Football many years ago  Social History/Occupation:  in [place]      REVIEW OF SYSTEMS:  Review of Systems    OBJECTIVE:  Ht 1.829 m (6')   Wt 86.6 kg (191 lb)   BMI 25.90 kg/m         RADIOLOGY:  Final results and radiologist's interpretation, available in the [facility] health record.  Images were reviewed with the patient in the office today.  My personal interpretation of the performed imaging: cuff tendinosis, with at least partial  tearing supraspinatus, more at infraspinatus. + bursitis. AC arthrosis.        EXAM: MR right shoulder without  contrast 11/6/2024 8:13 AM     TECHNIQUE: Multiplanar, multisequence imaging of the right shoulder  were obtained without administration of intravenous or intra-articular  gadolinium contrast using routine protocol.     History: evaluate rotator cuff; Shoulder pain; Rotator cuff injury; No  known/automatically detected potential contraindications to imaging;  Chronic right shoulder pain; Chronic right shoulder pain     Comparison: 4/12/2012     Findings:     ROTATOR CUFF and ASSOCIATED STRUCTURES  Rotator cuff: On a background of tendinosis, low to moderate grade  intrasubstance tear of supraspinatus tendon middle fibers at the  footprint involving approximately 4 mm in anterior posterior  dimension.      On a background of tendinosis, moderate to high-grade intrasubstance  tear of the infraspinatus anterior and middle fibers at the footprint  involving approximately 9 mm in anterior posterior dimension. Possible  articular sided communication channel may be present.     Teres minor tendon is intact. Subscapularis tendinosis.     Bursa: Mild subacromial/subdeltoid bursitis.     Musculature: Muscle bulk of rotator cuff is preserved.  Deltoid muscle  bulk is also preserved.  No muscle edema.     Acromioclavicular joint  There are moderate degenerative changes of the acromioclavicular  joint. Acromion is type 2 in sagittal morphology.  Coracoacromial  ligament is not thickened.     OSSEOUS STRUCTURES  No fracture, marrow contusion or marrow infiltration. Subcortical  cystlike changes at the greater tuberosity posteriorly.     LONG BICIPITAL TENDON  The long head of the biceps tendon is normally situated within the  bicipital groove. No complete or partial biceps tendon tear is  present.     GLENOHUMERAL JOINT  Joint fluid: Physiologic amount of joint fluid is  present.     Cartilage and subarticular bone:  No  focal hyaline cartilage defects  are noted. No Hill-Sachs, reverse Hill-Sachs, or bony Bankart lesions  are seen.     Labrum: Limited assessment on this study with relative lack of joint  distention shows suspected inferior and posterosuperior labral tear.  Additionally diffuse signal heterogeneity anterior labrum, which may  be related to additional area of tearing.     ANCILLARY FINDINGS:  Mild partial effacement of rotator interval fat without substantial  edema, nonspecific.                                                                      Impression:  1. Since comparison MR from 2012, new partial tearing of the rotator  cuff tendons.  *  Low to moderate grade intrasubstance tear of supraspinatus tendon  middle fibers at the footprint.   *  Moderate to high-grade intrasubstance  tear of the infraspinatus  anterior and middle fibers at the footprint. Possible articular sided  communication channel may be present.  *  No muscle atrophy.  2. Moderate acromioclavicular joint degenerative changes.  3. Mild subacromial/subdeltoid bursitis.  4. Query inferior and posterosuperior labral tear, and possible  additional anterior labral tear.     MANJULA LIRA           Large Joint Injection/Arthocentesis: R subacromial bursa    Date/Time: 11/19/2024 4:31 PM    Performed by: Ayo Butt DO  Authorized by: Ayo Butt DO    Indications:  Pain  Needle Size:  25 G  Guidance: landmark guided    Approach:  Posterolateral  Location:  Shoulder      Site:  R subacromial bursa  Medications:  40 mg triamcinolone 40 MG/ML; 2 mL lidocaine 1 %  Outcome:  Tolerated well, no immediate complications  Procedure discussed: discussed risks, benefits, and alternatives    Consent Given by:  Patient  Timeout: timeout called immediately prior to procedure    Prep: patient was prepped and draped in usual sterile fashion            15 minutes spent by me on the date of the encounter doing chart review, history and exam,  documentation and further activities per the note, not including injection

## 2024-11-19 NOTE — PATIENT INSTRUCTIONS
Right shoulder MRI reviewed, with partial thickness tendon tearing.  Right shoulder subacromial steroid injection done today.  Proceed with physical therapy as planned.  With injection, should take effect in next few days, can be up to 1-2 weeks for maximal benefit.  With the physical therapy, plan to monitor 4-6 weeks.    If improving, then follow-up is open ended.  Otherwise, contact clinic or follow-up if any questions/concerns.    If you have any further questions for your physician or physician s care team you can contact them thru [facility] or by calling [phone].          Injection Discharge Instructions    You may shower, however avoid swimming, tub baths or hot tubs for 24 hours following your procedure  You may have a mild to moderate increase in pain for several days following the injection.  It may take up to 14 days for the steroid medication to start working although you may feel the effect as early as a few days after the procedure.  You may use ice packs for 10-15 minutes, 3 to 4 times a day at the injection site for comfort  You may use anti-inflammatory medications (such as Ibuprofen or Aleve or Advil) if you are able to take safely, or acetaminophen (Tylenol) for pain control if necessary  If you were fasting, you may resume your normal diet and medications after the procedure  If you have diabetes, check your blood sugar more frequently than usual as your blood sugar may be higher than normal for 10-14 days following a steroid injection. Contact your doctor who manages your diabetes if your blood sugar is higher than usual    If you experience any of the following, call the clinic @ [phone]  - Fever over 100 degrees F  - Swelling, bleeding, redness, drainage, warmth at the injection site  - New or worsening pain

## 2024-11-20 ENCOUNTER — THERAPY VISIT (OUTPATIENT)
Dept: PHYSICAL THERAPY | Facility: CLINIC | Age: 49
End: 2024-11-20
Attending: PEDIATRICS
Payer: COMMERCIAL

## 2024-11-20 DIAGNOSIS — G89.29 CHRONIC RIGHT SHOULDER PAIN: ICD-10-CM

## 2024-11-20 DIAGNOSIS — M25.511 CHRONIC RIGHT SHOULDER PAIN: ICD-10-CM

## 2024-11-20 PROCEDURE — 97161 PT EVAL LOW COMPLEX 20 MIN: CPT | Mod: GP | Performed by: PHYSICAL THERAPIST

## 2024-11-20 PROCEDURE — 97110 THERAPEUTIC EXERCISES: CPT | Mod: GP | Performed by: PHYSICAL THERAPIST

## 2024-11-20 NOTE — PROGRESS NOTES
PHYSICAL THERAPY EVALUATION  Type of Visit: Evaluation       Fall Risk Screen:  Fall screen completed by: PT  Have you fallen 2 or more times in the past year?: (Patient-Rptd) No  Have you fallen and had an injury in the past year?: (Patient-Rptd) No  Is patient a fall risk?: No    Subjective     Pt is a 49 year old male presenting with complaints of R shoulder pain.   The symptoms started many years ago.  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   Pain is better with ibuprofen, ice, massage  Sleep Quality: occasionally will wake with pain  Current level of activity: active at job and goes to the gym 3-5x/week. Modifies OH movements and reduced weight   Goals of therapy: throw the ball, lift without     MRI 11/5/24  Impression:  1. Since comparison MR from 2012, new partial tearing of the rotator  cuff tendons.  *  Low to moderate grade intrasubstance tear of supraspinatus tendon  middle fibers at the footprint.   *  Moderate to high-grade intrasubstance  tear of the infraspinatus  anterior and middle fibers at the footprint. Possible articular sided  communication channel may be present.  *  No muscle atrophy.  2. Moderate acromioclavicular joint degenerative changes.  3. Mild subacromial/subdeltoid bursitis.  4. Query inferior and posterosuperior labral tear, and possible  additional anterior labral tear.          Presenting condition or subjective complaint: (Patient-Rptd) Partially torn rotator cuff  Date of onset: 11/20/24 (chronic issues)    Prior diagnostic imaging/testing results: (Patient-Rptd) MRI; X-ray     Prior therapy history for the same diagnosis, illness or injury: (Patient-Rptd) No      Prior Level of Function  Transfers:   Ambulation:   ADL:   IADL:     Living Environment  Social support: (Patient-Rptd) With a significant other or spouse   Type of home: (Patient-Rptd) House   Stairs to enter the home: (Patient-Rptd) Yes  (Patient-Rptd) 3 Is there a railing: (Patient-Rptd) Yes     Ramp: (Patient-Rptd) No   Stairs inside the home: (Patient-Rptd) Yes (Patient-Rptd) 10 Is there a railing: (Patient-Rptd) Yes     Help at home: (Patient-Rptd) None  Employment: (Patient-Rptd) Yes    Hobbies/Interests: (Patient-Rptd) Working out, hunting, fishing    Patient goals for therapy: (Patient-Rptd) Throw a ball and lift weights    Pain assessment: See objective evaluation for additional pain details     Objective       SHOULDER EVALUATION    PAIN: Pain Level at Rest: 1/10  Pain Level with Use: 9/10 when throwing  Pain Location: shoulder  Pain Quality: Aching but Stabbing when throwing     Scapular retraction:     ROM:   (Degrees) Right AROM Right PROM Left  AROM  Left PROM   Shoulder Flexion 160 (pain starting at 130)  160    Shoulder Extension 55  70    Shoulder Abduction 150 (pain starting at 110)  165    Shoulder Internal Rotation Low back  scapula    Shoulder External Rotation 50  70        STRENGTH:   Pain: - none + mild ++ moderate +++ severe  Strength Scale: 0-5/5 Right Left   Shoulder Flexion 4 pain 5   Shoulder Extension     Shoulder Abduction 4 pain 5   Shoulder Adduction     Shoulder Internal Rotation 4 pain  5   Shoulder External Rotation 4 pain  5   Elbow Flexion     Elbow Extension     Mid Trap     Lower Trap         SPECIAL TESTS:    Right Left   Impingement     Neer's     Hawkin's-Lyle -    Empty Can / Full Can      Internal impingement     Labral     Anterior Slide     [place]'s     Crank     Instability     Apprehension (anterior)     Relocation (anterior)     Anterior Load & Shift     Posterior Load & Shift     Posterior instability (with 90 degrees flex)     Multi-Directional Instability      Sulcus     Biceps      Speed's     Rotator Cuff Tear     Drop Arm -    ERLS -    Lift off  -        PALPATION: R UT, LS      Assessment & Plan   CLINICAL IMPRESSIONS  Medical Diagnosis: Chronic right shoulder pain    Treatment Diagnosis:  Chronic right shoulder pain   Impression/Assessment: Patient is a 49 year old male with R shoulder complaints.  The following significant findings have been identified: Pain, Decreased ROM/flexibility, Decreased strength, Impaired muscle performance, Decreased activity tolerance, and Impaired posture. These impairments interfere with their ability to perform work tasks and recreational activities as compared to previous level of function.     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   Patient/Family/caregiver agrees with Plan of Care.     Evaluation Time:     PT Eval, Low Complexity Minutes (21737): 20       Signing Clinician: Jasmine Garcia, PT

## 2024-11-25 RX ORDER — TRIAMCINOLONE ACETONIDE 40 MG/ML
40 INJECTION, SUSPENSION INTRA-ARTICULAR; INTRAMUSCULAR
Status: COMPLETED | OUTPATIENT
Start: 2024-11-19 | End: 2024-11-19

## 2024-11-25 RX ORDER — LIDOCAINE HYDROCHLORIDE 10 MG/ML
2 INJECTION, SOLUTION INFILTRATION; PERINEURAL
Status: COMPLETED | OUTPATIENT
Start: 2024-11-19 | End: 2024-11-19

## 2024-12-08 ENCOUNTER — ANCILLARY PROCEDURE (OUTPATIENT)
Dept: MRI IMAGING | Facility: CLINIC | Age: 49
End: 2024-12-08
Attending: STUDENT IN AN ORGANIZED HEALTH CARE EDUCATION/TRAINING PROGRAM
Payer: COMMERCIAL

## 2024-12-08 DIAGNOSIS — R97.20 ELEVATED PROSTATE SPECIFIC ANTIGEN (PSA): ICD-10-CM

## 2024-12-08 PROCEDURE — 72197 MRI PELVIS W/O & W/DYE: CPT | Performed by: RADIOLOGY

## 2024-12-08 PROCEDURE — A9585 GADOBUTROL INJECTION: HCPCS | Mod: JW | Performed by: RADIOLOGY

## 2024-12-08 RX ORDER — GADOBUTROL 604.72 MG/ML
10 INJECTION INTRAVENOUS ONCE
Status: COMPLETED | OUTPATIENT
Start: 2024-12-08 | End: 2024-12-08

## 2024-12-08 RX ADMIN — GADOBUTROL 9 ML: 604.72 INJECTION INTRAVENOUS at 08:36

## 2024-12-09 ENCOUNTER — THERAPY VISIT (OUTPATIENT)
Dept: PHYSICAL THERAPY | Facility: CLINIC | Age: 49
End: 2024-12-09
Payer: COMMERCIAL

## 2024-12-09 DIAGNOSIS — R97.20 ELEVATED PROSTATE SPECIFIC ANTIGEN (PSA): Primary | ICD-10-CM

## 2024-12-09 DIAGNOSIS — G89.29 CHRONIC RIGHT SHOULDER PAIN: Primary | ICD-10-CM

## 2024-12-09 DIAGNOSIS — M25.511 CHRONIC RIGHT SHOULDER PAIN: Primary | ICD-10-CM

## 2024-12-09 PROCEDURE — 97110 THERAPEUTIC EXERCISES: CPT | Mod: GP | Performed by: PHYSICAL THERAPIST
